# Patient Record
Sex: FEMALE | Race: WHITE | HISPANIC OR LATINO
[De-identification: names, ages, dates, MRNs, and addresses within clinical notes are randomized per-mention and may not be internally consistent; named-entity substitution may affect disease eponyms.]

---

## 2017-11-07 ENCOUNTER — RESULT REVIEW (OUTPATIENT)
Age: 37
End: 2017-11-07

## 2018-12-06 ENCOUNTER — OUTPATIENT (OUTPATIENT)
Dept: OUTPATIENT SERVICES | Facility: HOSPITAL | Age: 38
LOS: 1 days | End: 2018-12-06
Payer: COMMERCIAL

## 2018-12-06 ENCOUNTER — APPOINTMENT (OUTPATIENT)
Dept: ULTRASOUND IMAGING | Facility: IMAGING CENTER | Age: 38
End: 2018-12-06
Payer: COMMERCIAL

## 2018-12-06 ENCOUNTER — RESULT REVIEW (OUTPATIENT)
Age: 38
End: 2018-12-06

## 2018-12-06 DIAGNOSIS — Z90.49 ACQUIRED ABSENCE OF OTHER SPECIFIED PARTS OF DIGESTIVE TRACT: Chronic | ICD-10-CM

## 2018-12-06 DIAGNOSIS — Z00.8 ENCOUNTER FOR OTHER GENERAL EXAMINATION: ICD-10-CM

## 2018-12-06 PROCEDURE — 76942 ECHO GUIDE FOR BIOPSY: CPT | Mod: 26

## 2018-12-06 PROCEDURE — 88172 CYTP DX EVAL FNA 1ST EA SITE: CPT

## 2018-12-06 PROCEDURE — 88173 CYTOPATH EVAL FNA REPORT: CPT | Mod: 26

## 2018-12-06 PROCEDURE — 10022: CPT

## 2018-12-06 PROCEDURE — 88173 CYTOPATH EVAL FNA REPORT: CPT

## 2018-12-06 PROCEDURE — 76942 ECHO GUIDE FOR BIOPSY: CPT

## 2019-12-01 ENCOUNTER — TRANSCRIPTION ENCOUNTER (OUTPATIENT)
Age: 39
End: 2019-12-01

## 2021-02-04 ENCOUNTER — APPOINTMENT (OUTPATIENT)
Dept: INTERNAL MEDICINE | Facility: CLINIC | Age: 41
End: 2021-02-04

## 2021-04-23 ENCOUNTER — TRANSCRIPTION ENCOUNTER (OUTPATIENT)
Age: 41
End: 2021-04-23

## 2021-05-25 ENCOUNTER — APPOINTMENT (OUTPATIENT)
Dept: INTERNAL MEDICINE | Facility: CLINIC | Age: 41
End: 2021-05-25

## 2021-05-25 ENCOUNTER — APPOINTMENT (OUTPATIENT)
Dept: INTERNAL MEDICINE | Facility: CLINIC | Age: 41
End: 2021-05-25
Payer: COMMERCIAL

## 2021-05-25 VITALS
DIASTOLIC BLOOD PRESSURE: 80 MMHG | BODY MASS INDEX: 23.82 KG/M2 | HEART RATE: 79 BPM | TEMPERATURE: 97.7 F | SYSTOLIC BLOOD PRESSURE: 138 MMHG | RESPIRATION RATE: 16 BRPM | OXYGEN SATURATION: 98 % | HEIGHT: 65 IN | WEIGHT: 143 LBS

## 2021-05-25 DIAGNOSIS — Z78.9 OTHER SPECIFIED HEALTH STATUS: ICD-10-CM

## 2021-05-25 DIAGNOSIS — N60.11 DIFFUSE CYSTIC MASTOPATHY OF RIGHT BREAST: ICD-10-CM

## 2021-05-25 DIAGNOSIS — J45.909 UNSPECIFIED ASTHMA, UNCOMPLICATED: ICD-10-CM

## 2021-05-25 DIAGNOSIS — Z82.49 FAMILY HISTORY OF ISCHEMIC HEART DISEASE AND OTHER DISEASES OF THE CIRCULATORY SYSTEM: ICD-10-CM

## 2021-05-25 DIAGNOSIS — N60.12 DIFFUSE CYSTIC MASTOPATHY OF RIGHT BREAST: ICD-10-CM

## 2021-05-25 DIAGNOSIS — Z83.511 FAMILY HISTORY OF GLAUCOMA: ICD-10-CM

## 2021-05-25 DIAGNOSIS — Z82.5 FAMILY HISTORY OF ASTHMA AND OTHER CHRONIC LOWER RESPIRATORY DISEASES: ICD-10-CM

## 2021-05-25 DIAGNOSIS — Z83.49 FAMILY HISTORY OF OTHER ENDOCRINE, NUTRITIONAL AND METABOLIC DISEASES: ICD-10-CM

## 2021-05-25 PROCEDURE — 99204 OFFICE O/P NEW MOD 45 MIN: CPT

## 2021-05-26 ENCOUNTER — TRANSCRIPTION ENCOUNTER (OUTPATIENT)
Age: 41
End: 2021-05-26

## 2021-05-26 PROBLEM — Z78.9 NON-SMOKER: Status: ACTIVE | Noted: 2021-05-25

## 2021-05-26 PROBLEM — Z83.511 FAMILY HISTORY OF GLAUCOMA: Status: ACTIVE | Noted: 2021-05-25

## 2021-05-26 PROBLEM — J45.909 ASTHMA: Status: ACTIVE | Noted: 2021-05-26

## 2021-05-26 PROBLEM — N60.11 BILATERAL FIBROCYSTIC BREAST CHANGES: Status: ACTIVE | Noted: 2021-05-25

## 2021-05-26 PROBLEM — Z82.5 FAMILY HISTORY OF ASTHMA: Status: ACTIVE | Noted: 2021-05-25

## 2021-05-26 PROBLEM — Z78.9 SOCIAL ALCOHOL USE: Status: ACTIVE | Noted: 2021-05-25

## 2021-05-26 PROBLEM — Z83.49 FAMILY HISTORY OF THYROID DISEASE: Status: ACTIVE | Noted: 2021-05-26

## 2021-05-26 PROBLEM — Z82.49 FAMILY HISTORY OF HYPERTENSION: Status: ACTIVE | Noted: 2021-05-25

## 2021-05-26 RX ORDER — ALBUTEROL 90 MCG
90 AEROSOL (GRAM) INHALATION
Refills: 0 | Status: ACTIVE | COMMUNITY

## 2021-05-26 NOTE — HISTORY OF PRESENT ILLNESS
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

## 2021-05-26 NOTE — PHYSICAL EXAM
[No Acute Distress] : no acute distress [Well Nourished] : well nourished [Well Developed] : well developed [Well-Appearing] : well-appearing [Normal Sclera/Conjunctiva] : normal sclera/conjunctiva [PERRL] : pupils equal round and reactive to light [EOMI] : extraocular movements intact [Normal Outer Ear/Nose] : the outer ears and nose were normal in appearance [Normal Oropharynx] : the oropharynx was normal [No JVD] : no jugular venous distention [No Lymphadenopathy] : no lymphadenopathy [Supple] : supple [Thyroid Normal, No Nodules] : the thyroid was normal and there were no nodules present [No Respiratory Distress] : no respiratory distress  [No Accessory Muscle Use] : no accessory muscle use [Clear to Auscultation] : lungs were clear to auscultation bilaterally [Normal Rate] : normal rate  [Regular Rhythm] : with a regular rhythm [Normal S1, S2] : normal S1 and S2 [No Murmur] : no murmur heard [No Carotid Bruits] : no carotid bruits [No Abdominal Bruit] : a ~M bruit was not heard ~T in the abdomen [No Varicosities] : no varicosities [Pedal Pulses Present] : the pedal pulses are present [No Edema] : there was no peripheral edema [No Palpable Aorta] : no palpable aorta [No Extremity Clubbing/Cyanosis] : no extremity clubbing/cyanosis [Normal Appearance] : normal in appearance [No Nipple Discharge] : no nipple discharge [No Axillary Lymphadenopathy] : no axillary lymphadenopathy [Soft] : abdomen soft [Non Tender] : non-tender [Non-distended] : non-distended [No Masses] : no abdominal mass palpated [No HSM] : no HSM [Normal Bowel Sounds] : normal bowel sounds [Normal Posterior Cervical Nodes] : no posterior cervical lymphadenopathy [Normal Anterior Cervical Nodes] : no anterior cervical lymphadenopathy [No CVA Tenderness] : no CVA  tenderness [No Spinal Tenderness] : no spinal tenderness [No Joint Swelling] : no joint swelling [Grossly Normal Strength/Tone] : grossly normal strength/tone [No Rash] : no rash [Coordination Grossly Intact] : coordination grossly intact [No Focal Deficits] : no focal deficits [Normal Gait] : normal gait [Deep Tendon Reflexes (DTR)] : deep tendon reflexes were 2+ and symmetric [Normal Affect] : the affect was normal [Normal Insight/Judgement] : insight and judgment were intact [de-identified] : increased breast density right breast

## 2021-05-26 NOTE — HEALTH RISK ASSESSMENT
[] : No [No] : No [0] : 2) Feeling down, depressed, or hopeless: Not at all (0) [[id number]] : 0 [Patient reported PAP Smear was normal] : Patient reported PAP Smear was normal [PapSmearDate] : 2020

## 2021-05-28 ENCOUNTER — TRANSCRIPTION ENCOUNTER (OUTPATIENT)
Age: 41
End: 2021-05-28

## 2021-05-28 LAB
25(OH)D3 SERPL-MCNC: 20.9 NG/ML
ALBUMIN SERPL ELPH-MCNC: 4.7 G/DL
ALP BLD-CCNC: 79 U/L
ALT SERPL-CCNC: 11 U/L
ANION GAP SERPL CALC-SCNC: 11 MMOL/L
AST SERPL-CCNC: 17 U/L
BASOPHILS # BLD AUTO: 0.03 K/UL
BASOPHILS NFR BLD AUTO: 0.5 %
BILIRUB SERPL-MCNC: 0.3 MG/DL
BUN SERPL-MCNC: 16 MG/DL
CALCIUM SERPL-MCNC: 9.7 MG/DL
CHLORIDE SERPL-SCNC: 106 MMOL/L
CHOLEST SERPL-MCNC: 162 MG/DL
CO2 SERPL-SCNC: 21 MMOL/L
CREAT SERPL-MCNC: 0.73 MG/DL
EOSINOPHIL # BLD AUTO: 0.06 K/UL
EOSINOPHIL NFR BLD AUTO: 0.9 %
ESTIMATED AVERAGE GLUCOSE: 100 MG/DL
GLUCOSE SERPL-MCNC: 100 MG/DL
HBA1C MFR BLD HPLC: 5.1 %
HCT VFR BLD CALC: 37.8 %
HDLC SERPL-MCNC: 88 MG/DL
HGB BLD-MCNC: 11.8 G/DL
IMM GRANULOCYTES NFR BLD AUTO: 0.3 %
LDLC SERPL CALC-MCNC: 59 MG/DL
LYMPHOCYTES # BLD AUTO: 1.52 K/UL
LYMPHOCYTES NFR BLD AUTO: 22.8 %
MAN DIFF?: NORMAL
MCHC RBC-ENTMCNC: 26.3 PG
MCHC RBC-ENTMCNC: 31.2 GM/DL
MCV RBC AUTO: 84.4 FL
MONOCYTES # BLD AUTO: 0.46 K/UL
MONOCYTES NFR BLD AUTO: 6.9 %
NEUTROPHILS # BLD AUTO: 4.57 K/UL
NEUTROPHILS NFR BLD AUTO: 68.6 %
NONHDLC SERPL-MCNC: 75 MG/DL
PLATELET # BLD AUTO: 224 K/UL
POTASSIUM SERPL-SCNC: 4.3 MMOL/L
PROT SERPL-MCNC: 7.2 G/DL
RBC # BLD: 4.48 M/UL
RBC # FLD: 15.9 %
SODIUM SERPL-SCNC: 138 MMOL/L
TRIGL SERPL-MCNC: 77 MG/DL
TSH SERPL-ACNC: 0.44 UIU/ML
VIT B12 SERPL-MCNC: 515 PG/ML
WBC # FLD AUTO: 6.66 K/UL

## 2021-06-10 ENCOUNTER — NON-APPOINTMENT (OUTPATIENT)
Age: 41
End: 2021-06-10

## 2021-06-16 ENCOUNTER — NON-APPOINTMENT (OUTPATIENT)
Age: 41
End: 2021-06-16

## 2021-06-25 ENCOUNTER — TRANSCRIPTION ENCOUNTER (OUTPATIENT)
Age: 41
End: 2021-06-25

## 2021-06-28 ENCOUNTER — TRANSCRIPTION ENCOUNTER (OUTPATIENT)
Age: 41
End: 2021-06-28

## 2021-08-04 NOTE — HISTORY OF PRESENT ILLNESS
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

## 2021-08-05 ENCOUNTER — APPOINTMENT (OUTPATIENT)
Dept: INTERNAL MEDICINE | Facility: CLINIC | Age: 41
End: 2021-08-05

## 2021-08-18 NOTE — ASSESSMENT
[FreeTextEntry1] : OK to LM with lab results incident   Continue current regimen and medications.   medical clearance pending

## 2021-08-31 ENCOUNTER — APPOINTMENT (OUTPATIENT)
Dept: INTERNAL MEDICINE | Facility: CLINIC | Age: 41
End: 2021-08-31
Payer: COMMERCIAL

## 2021-08-31 DIAGNOSIS — M25.562 PAIN IN LEFT KNEE: ICD-10-CM

## 2021-08-31 DIAGNOSIS — K62.2 ANAL PROLAPSE: ICD-10-CM

## 2021-08-31 DIAGNOSIS — K59.00 CONSTIPATION, UNSPECIFIED: ICD-10-CM

## 2021-08-31 DIAGNOSIS — N84.0 POLYP OF CORPUS UTERI: ICD-10-CM

## 2021-08-31 PROCEDURE — 99442: CPT

## 2021-09-01 ENCOUNTER — TRANSCRIPTION ENCOUNTER (OUTPATIENT)
Age: 41
End: 2021-09-01

## 2021-09-01 PROBLEM — M25.562 LEFT KNEE PAIN: Status: ACTIVE | Noted: 2021-08-31

## 2021-09-01 PROBLEM — N84.0 UTERINE POLYP: Status: ACTIVE | Noted: 2021-09-01

## 2021-09-01 PROBLEM — K59.00 CONSTIPATION: Status: ACTIVE | Noted: 2021-09-01

## 2021-09-01 PROBLEM — K62.2 ANAL PROLAPSE: Status: ACTIVE | Noted: 2021-08-31

## 2021-09-01 NOTE — HISTORY OF PRESENT ILLNESS
[Home] : at home, [unfilled] , at the time of the visit. 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 PMH multinodular goiter, exercise-induced asthma, fibrocystic breast, uterine polyps presents for follow up\par \par s/p uterine polypectomy at [facility] 1 month ago\par constipation after polypectomy - on psyllium and fluids\par BM qod now\par endorses anal prolapse, denies rectal bleeding\par \par s/p left breast bx for left breast pain - obstructed ducts - supportive treatment\par repeat breast US in 6 mos\par \par endorses flare chronic LBP after surgery\par no motor or sensory changes, denies bowel or bladder dysfunction\par \par endorses left knee pain after surgery\par admits not able to exercise routinely given recent procedures and surgeries

## 2021-09-02 ENCOUNTER — APPOINTMENT (OUTPATIENT)
Dept: SURGERY | Facility: CLINIC | Age: 41
End: 2021-09-02
Payer: COMMERCIAL

## 2021-09-02 VITALS
WEIGHT: 138 LBS | HEART RATE: 80 BPM | HEIGHT: 65 IN | DIASTOLIC BLOOD PRESSURE: 80 MMHG | SYSTOLIC BLOOD PRESSURE: 129 MMHG | BODY MASS INDEX: 22.99 KG/M2

## 2021-09-02 DIAGNOSIS — K60.2 ANAL FISSURE, UNSPECIFIED: ICD-10-CM

## 2021-09-02 PROCEDURE — 46600 DIAGNOSTIC ANOSCOPY SPX: CPT

## 2021-09-02 PROCEDURE — 99203 OFFICE O/P NEW LOW 30 MIN: CPT | Mod: 25

## 2021-09-02 RX ORDER — LIDOCAINE 5 G/100G
5 OINTMENT TOPICAL
Qty: 60 | Refills: 1 | Status: ACTIVE | COMMUNITY
Start: 2021-09-02 | End: 1900-01-01

## 2021-09-02 RX ORDER — HYDROCORTISONE 25 MG/G
2.5 CREAM TOPICAL
Qty: 1 | Refills: 1 | Status: ACTIVE | COMMUNITY
Start: 2021-09-02 | End: 1900-01-01

## 2021-09-04 NOTE — HISTORY OF PRESENT ILLNESS
[FreeTextEntry1] : Ms. Marina Bedolla is a 40y.o. F presenting for rectal pain and feeling like something is dropping in and out of her rectum. Back on July 21st she had a procedure for uterine polyps and has had an issue with constipation since then. Now when she goes to the bathroom (even sometimes just to urinate) she feels like there is a small piece of tissue on the posterior aspect that descends and almost comes out but then goes back in when she stops pushing. She was also having burning, itching and a feeling like a cut right on the inside. She was prescribed hydrocortisone and a laxative which helped though she stopped the laxative due to diarrhea. She has since started psyllium husk BID for the past wk and colace for the past 2days. Prior to the uterine procedure and constipation she had BMs shortly after meals and they were not hard. Now she has them once a day and they are weird looking (look strung out) and sometimes there are corey w/ some straining. She has never had a colonoscopy.

## 2021-09-04 NOTE — ASSESSMENT
[FreeTextEntry1] : 40y.o. F w/ anal fissure and spontaneously reducing prolapsing internal hemorrhoid.

## 2021-09-04 NOTE — REVIEW OF SYSTEMS
[As Noted in HPI] : as noted in HPI [Constipation] : constipation [Negative] : Integumentary [Fever] : no fever [Chills] : no chills [Recent Weight Loss (___ Lbs)] : no recent weight loss [Eye Pain] : no eye pain [Earache] : no earache [Chest Pain] : no chest pain [Palpitations] : no palpitations [Shortness Of Breath] : no shortness of breath [Abdominal Pain] : no abdominal pain [Vomiting] : no vomiting [Dysuria] : no dysuria

## 2021-09-04 NOTE — PHYSICAL EXAM
[Reduce Spontaneously] : a spontaneously reducible (grade II) [Normal] : was normal [Posterior] : posteriorly [None] : there was no rectal mass  [Normal Breath Sounds] : Normal breath sounds [Normal Heart Sounds] : normal heart sounds [Normal Rate and Rhythm] : normal rate and rhythm [Alert] : alert [Oriented to Person] : oriented to person [Oriented to Place] : oriented to place [Oriented to Time] : oriented to time [Excoriation] : no perianal excoriation [Fistula] : no fistulas [Gross Blood] : no gross blood [JVD] : no jugular venous distention  [Wheezing] : no wheezing was heard [de-identified] : soft, non-distended, non-tender to palpation [de-identified] : no external hemorrhoids, normal rectal tone, mild posterior midline pain on JB [de-identified] : awake, alert, in NAD [de-identified] : normocephalic, atraumatic, EOMI, nl conjunctiva [de-identified] : b/l chest rise, EWOB on RA [de-identified] : deferred [de-identified] : normal strength [de-identified] : perianal skin as above [de-identified] : normal mood and affect

## 2021-09-04 NOTE — PROCEDURE
[FreeTextEntry1] : Anoscopy - performed with small lighted disposable anoscope; healing posterior midline fissure, RP prolapsing internal hemorrhoid, normal rectal mucosa

## 2022-01-18 ENCOUNTER — APPOINTMENT (OUTPATIENT)
Dept: SURGERY | Facility: CLINIC | Age: 42
End: 2022-01-18
Payer: COMMERCIAL

## 2022-01-18 VITALS
OXYGEN SATURATION: 98 % | HEART RATE: 85 BPM | WEIGHT: 140 LBS | HEIGHT: 65 IN | SYSTOLIC BLOOD PRESSURE: 122 MMHG | BODY MASS INDEX: 23.32 KG/M2 | DIASTOLIC BLOOD PRESSURE: 78 MMHG

## 2022-01-18 VITALS — TEMPERATURE: 97 F

## 2022-01-18 PROCEDURE — 99214 OFFICE O/P EST MOD 30 MIN: CPT

## 2022-01-25 NOTE — PHYSICAL EXAM
[Excoriation] : no perianal excoriation [Reduce Spontaneously] : a spontaneously reducible (grade II) [Normal] : was normal [None] : there was no rectal mass  [Gross Blood] : no gross blood [JVD] : no jugular venous distention  [Normal Breath Sounds] : Normal breath sounds [Wheezing] : no wheezing was heard [Normal Heart Sounds] : normal heart sounds [Normal Rate and Rhythm] : normal rate and rhythm [Alert] : alert [Oriented to Person] : oriented to person [Oriented to Place] : oriented to place [Oriented to Time] : oriented to time [de-identified] : soft, non-distended, non-tender to palpation [de-identified] : no external hemorrhoids, normal rectal tone, no significant pain on JB [de-identified] : awake, alert, in NAD [de-identified] : normocephalic, atraumatic, EOMI, nl conjunctiva [de-identified] : b/l chest rise, EWOB on RA [de-identified] : deferred [de-identified] : normal strength [de-identified] : perianal skin as above [de-identified] : normal mood and affect

## 2022-01-25 NOTE — HISTORY OF PRESENT ILLNESS
[FreeTextEntry1] : Ms. Marina Bedolla is a 41y.o. F presenting for follow-up for her anal fissure. Back on July 21st, 2021 she had a procedure for uterine polyps and has had an issue with constipation since then. Now when she goes to the bathroom (even sometimes just to urinate) she feels like there is a small piece of tissue on the posterior aspect that descends and almost comes out but then goes back in when she stops pushing. She was also having burning, itching and a feeling like a cut right on the inside. She was prescribed hydrocortisone and a laxative which helped though she stopped the laxative due to diarrhea. She has since started psyllium husk BID for the past wk and colace for the past 2days. Prior to the uterine procedure and constipation she had BMs shortly after meals and they were not hard. After that she had them once a day and they are weird looking (look strung out) and sometimes there are corey w/ some straining. She has never had a colonoscopy. She completed the creams and treatment for the anal fissure and she reports doing much better. She has continued with the psyllium husk which has helped. no back pain, no gout, no musculoskeletal pain, no neck pain, and no weakness.

## 2022-01-25 NOTE — ASSESSMENT
[FreeTextEntry1] : 41y.o. F w/ anal fissure and spontaneously reducing prolapsing internal hemorrhoid.

## 2022-02-18 ENCOUNTER — APPOINTMENT (OUTPATIENT)
Dept: ENDOCRINOLOGY | Facility: CLINIC | Age: 42
End: 2022-02-18
Payer: COMMERCIAL

## 2022-02-18 VITALS
OXYGEN SATURATION: 99 % | TEMPERATURE: 97.9 F | HEIGHT: 65 IN | SYSTOLIC BLOOD PRESSURE: 120 MMHG | HEART RATE: 78 BPM | WEIGHT: 140 LBS | BODY MASS INDEX: 23.32 KG/M2 | DIASTOLIC BLOOD PRESSURE: 80 MMHG

## 2022-02-18 DIAGNOSIS — R79.89 OTHER SPECIFIED ABNORMAL FINDINGS OF BLOOD CHEMISTRY: ICD-10-CM

## 2022-02-18 DIAGNOSIS — E04.9 NONTOXIC GOITER, UNSPECIFIED: ICD-10-CM

## 2022-02-18 DIAGNOSIS — Z00.00 ENCOUNTER FOR GENERAL ADULT MEDICAL EXAMINATION W/OUT ABNORMAL FINDINGS: ICD-10-CM

## 2022-02-18 PROCEDURE — 99204 OFFICE O/P NEW MOD 45 MIN: CPT

## 2022-02-18 NOTE — REVIEW OF SYSTEMS
[Fatigue] : no fatigue [Decreased Appetite] : appetite not decreased [Recent Weight Gain (___ Lbs)] : no recent weight gain [Recent Weight Loss (___ Lbs)] : no recent weight loss [Visual Field Defect] : no visual field defect [Dry Eyes] : no dryness [Dysphagia] : no dysphagia [Neck Pain] : no neck pain [Dysphonia] : no dysphonia [Nasal Congestion] : no nasal congestion [Chest Pain] : no chest pain [Slow Heart Rate] : heart rate is not slow [Palpitations] : no palpitations [Fast Heart Rate] : heart rate is not fast [Shortness Of Breath] : no shortness of breath [Nausea] : no nausea [Cough] : no cough [Vomiting] : no vomiting [Polyuria] : no polyuria [Irregular Menses] : regular menses [Joint Pain] : no joint pain [Headaches] : no headaches [Dizziness] : no dizziness [Tremors] : no tremors [Pain/Numbness of Digits] : no pain/numbness of digits [Polydipsia] : no polydipsia [Cold Intolerance] : no cold intolerance [Easy Bleeding] : no ~M tendency for easy bleeding [Easy Bruising] : no tendency for easy bruising

## 2022-02-18 NOTE — PHYSICAL EXAM
[Alert] : alert [Well Nourished] : well nourished [Normal Sclera/Conjunctiva] : normal sclera/conjunctiva [EOMI] : extra ocular movement intact [PERRL] : pupils equal, round and reactive to light [Normal Outer Ear/Nose] : the ears and nose were normal in appearance [Normal Hearing] : hearing was normal [Normal TMs] : both tympanic membranes were normal [No Neck Mass] : no neck mass was observed [Thyroid Not Enlarged] : the thyroid was not enlarged [No Respiratory Distress] : no respiratory distress [Clear to Auscultation] : lungs were clear to auscultation bilaterally [Normal S1, S2] : normal S1 and S2 [Normal Rate] : heart rate was normal [Regular Rhythm] : with a regular rhythm [Normal Bowel Sounds] : normal bowel sounds [Not Tender] : non-tender [Soft] : abdomen soft [Normal Gait] : normal gait [No Clubbing, Cyanosis] : no clubbing  or cyanosis of the fingernails [No Joint Swelling] : no joint swelling seen [Normal Strength/Tone] : muscle strength and tone were normal [No Rash] : no rash [No Skin Lesions] : no skin lesions [No Motor Deficits] : the motor exam was normal [Normal Reflexes] : deep tendon reflexes were 2+ and symmetric [No Tremors] : no tremors [Oriented x3] : oriented to person, place, and time [Normal Affect] : the affect was normal [Normal Insight/Judgement] : insight and judgment were intact [Normal Mood] : the mood was normal

## 2022-02-18 NOTE — ASSESSMENT
[FreeTextEntry1] : 41 year old female with history of thyroid nodules here for evaluation. \par She has multiple cysts in bilateral lobes. \par However on the left she is noted to have a mixed nodule 2.79 x 2.05 x 2.62 cm, predominantly cystic \par No compressive neck symptoms \par Will continue observation \par If further growth noted, aspiration and ethanol ablation has been discussed with patient\par \par \par -Follow up in 6 months for repeat US

## 2022-02-18 NOTE — HISTORY OF PRESENT ILLNESS
[FreeTextEntry1] : 41 year old female here for evaluation of thyroid nodules \par \par History of 2 biopsies and the last one was in 2019 \par She has been having check ups with an ultrasound \par \par She reported that she has been having low moods, periods have been light and decreased energy \par Increased hunger and bowel movements \par Decreased sleep, heart palpitations and tremors \par

## 2022-02-22 LAB
25(OH)D3 SERPL-MCNC: 26.8 NG/ML
ANION GAP SERPL CALC-SCNC: 13 MMOL/L
BUN SERPL-MCNC: 12 MG/DL
CALCIUM SERPL-MCNC: 10.2 MG/DL
CHLORIDE SERPL-SCNC: 105 MMOL/L
CHOLEST SERPL-MCNC: 164 MG/DL
CO2 SERPL-SCNC: 23 MMOL/L
CREAT SERPL-MCNC: 0.71 MG/DL
ESTIMATED AVERAGE GLUCOSE: 103 MG/DL
GLUCOSE SERPL-MCNC: 87 MG/DL
HBA1C MFR BLD HPLC: 5.2 %
HDLC SERPL-MCNC: 88 MG/DL
LDLC SERPL CALC-MCNC: 63 MG/DL
NONHDLC SERPL-MCNC: 76 MG/DL
POTASSIUM SERPL-SCNC: 4.8 MMOL/L
SODIUM SERPL-SCNC: 141 MMOL/L
T4 FREE SERPL-MCNC: 1.4 NG/DL
TRIGL SERPL-MCNC: 64 MG/DL
TSH SERPL-ACNC: 0.56 UIU/ML

## 2022-03-08 LAB
METANEPHRINE, PL: 55.6 PG/ML
NORMETANEPHRINE, PL: 74.8 PG/ML

## 2022-05-21 ENCOUNTER — APPOINTMENT (OUTPATIENT)
Dept: INTERNAL MEDICINE | Facility: CLINIC | Age: 42
End: 2022-05-21
Payer: COMMERCIAL

## 2022-05-21 VITALS
HEART RATE: 78 BPM | BODY MASS INDEX: 22.82 KG/M2 | TEMPERATURE: 98.8 F | RESPIRATION RATE: 16 BRPM | HEIGHT: 65 IN | DIASTOLIC BLOOD PRESSURE: 90 MMHG | OXYGEN SATURATION: 100 % | WEIGHT: 137 LBS | SYSTOLIC BLOOD PRESSURE: 151 MMHG

## 2022-05-21 PROCEDURE — 99214 OFFICE O/P EST MOD 30 MIN: CPT

## 2022-05-23 NOTE — HISTORY OF PRESENT ILLNESS
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

## 2022-05-29 ENCOUNTER — EMERGENCY (EMERGENCY)
Facility: HOSPITAL | Age: 42
LOS: 1 days | Discharge: HOME | End: 2022-05-29
Attending: STUDENT IN AN ORGANIZED HEALTH CARE EDUCATION/TRAINING PROGRAM
Payer: COMMERCIAL

## 2022-05-29 ENCOUNTER — TRANSCRIPTION ENCOUNTER (OUTPATIENT)
Age: 42
End: 2022-05-29

## 2022-05-29 VITALS
TEMPERATURE: 98 F | HEIGHT: 65 IN | DIASTOLIC BLOOD PRESSURE: 95 MMHG | OXYGEN SATURATION: 100 % | WEIGHT: 138.89 LBS | RESPIRATION RATE: 18 BRPM | SYSTOLIC BLOOD PRESSURE: 150 MMHG | HEART RATE: 58 BPM

## 2022-05-29 VITALS
DIASTOLIC BLOOD PRESSURE: 82 MMHG | TEMPERATURE: 98 F | SYSTOLIC BLOOD PRESSURE: 135 MMHG | HEART RATE: 57 BPM | RESPIRATION RATE: 18 BRPM | OXYGEN SATURATION: 100 %

## 2022-05-29 DIAGNOSIS — Z90.49 ACQUIRED ABSENCE OF OTHER SPECIFIED PARTS OF DIGESTIVE TRACT: Chronic | ICD-10-CM

## 2022-05-29 PROCEDURE — 99283 EMERGENCY DEPT VISIT LOW MDM: CPT

## 2022-05-29 RX ORDER — LIDOCAINE 4 G/100G
1 CREAM TOPICAL ONCE
Refills: 0 | Status: COMPLETED | OUTPATIENT
Start: 2022-05-29 | End: 2022-05-29

## 2022-05-29 RX ORDER — ACETAMINOPHEN 500 MG
650 TABLET ORAL ONCE
Refills: 0 | Status: COMPLETED | OUTPATIENT
Start: 2022-05-29 | End: 2022-05-29

## 2022-05-29 RX ORDER — DIAZEPAM 5 MG
5 TABLET ORAL ONCE
Refills: 0 | Status: DISCONTINUED | OUTPATIENT
Start: 2022-05-29 | End: 2022-05-29

## 2022-05-29 RX ORDER — IBUPROFEN 200 MG
600 TABLET ORAL ONCE
Refills: 0 | Status: COMPLETED | OUTPATIENT
Start: 2022-05-29 | End: 2022-05-29

## 2022-05-29 RX ADMIN — LIDOCAINE 1 PATCH: 4 CREAM TOPICAL at 18:14

## 2022-05-29 RX ADMIN — Medication 650 MILLIGRAM(S): at 18:13

## 2022-05-29 RX ADMIN — Medication 600 MILLIGRAM(S): at 18:13

## 2022-05-29 RX ADMIN — Medication 5 MILLIGRAM(S): at 19:12

## 2022-05-29 NOTE — ED PROVIDER NOTE - OBJECTIVE STATEMENT
41F PMH herniated disc, CC neck pain. PT denotes neck pain for two weeks no trauma, went to PCP was given flexaril. PT denotes did not help but helped pt sleep. Coming in today due to consistent pain. Pain located over cervical region, worse w/ moving neck to L and R. Sometimes has tingilng in arms when pain is bad.  Did not take Tylenol or Motrin. Also endorsing HA when pain is sever   LMP today  Denies F CP SOB N/v

## 2022-05-29 NOTE — ED ADULT NURSE NOTE - DRUG PRE-SCREENING (DAST -1)
NOVOLOG 100 UNIT/ML FLEXPEN   Last Written Prescription Date:  1/10/2020  Last Fill Quantity: 30,   # refills: 3  Last Office Visit : 5/15/2020  Future Office visit:      Routing refill request to provider for review/approval because:  Drug not on the [facility], [facility] or [facility] refill protocol or controlled substance      Dulce Sanchez RN  Central Triage Red Flags/Med Refills     Statement Selected

## 2022-05-29 NOTE — ED PROVIDER NOTE - NSFOLLOWUPCLINICS_GEN_ALL_ED_FT
[facility]  Sports Medicine  [address] [place] [address]  Phone: [phone]  Fax:   Follow Up Time: 1-3 Days

## 2022-05-29 NOTE — ED PROVIDER NOTE - PATIENT PORTAL LINK FT
You can access the FollowMyHealth Patient Portal offered by [facility] by registering at the following website: [email or web address]. By joining [employer]’s FollowMyHealth portal, you will also be able to view your health information using other applications (apps) compatible with our system.

## 2022-05-29 NOTE — ED ADULT NURSE NOTE - NSICDXPASTMEDICALHX_GEN_ALL_CORE_FT
PAST MEDICAL HISTORY:  Asthma     Lower Back Pain     Refusal of Blood Transfusions as Patient Is Evangelical

## 2022-05-29 NOTE — ED PROVIDER NOTE - NSICDXPASTMEDICALHX_GEN_ALL_CORE_FT
PAST MEDICAL HISTORY:  Asthma     Lower Back Pain     Refusal of Blood Transfusions as Patient Is Tenriism

## 2022-05-29 NOTE — ED PROVIDER NOTE - DOMESTIC TRAVEL HIGH RISK QUESTION
[FreeTextEntry3] : I, Ki Victoria, authored this note working as a medical scribe for Dr. De La Rosa.  10/06/2021.  4:30PM. This note was authored by the medical scribe for me. I have reviewed, edited, and revised the note as needed. I am in agreement with the exam findings, imaging findings, and treatment plan.  Renato De La Rosa MD 
No

## 2022-05-29 NOTE — ED PROVIDER NOTE - CLINICAL SUMMARY MEDICAL DECISION MAKING FREE TEXT BOX
41F w prior Cervical disc bulging on MRI in cervical region. CC 41F w prior Cervical disc bulging on MRI in cervical region. CC neck pain given hx and physical likely MSK in origin

## 2022-05-29 NOTE — ED PROVIDER NOTE - NSFOLLOWUPINSTRUCTIONS_ED_ALL_ED_FT
Today you were seen in the ED for     It was found that you have    Please follow up with    Please return to the ED if you have any of the following: Today you were seen in the ED for neck pain    It was found that you have no signs of a medical emergency on today's evaluation.     Please follow up with your primary care provider in regards to today's visit. In addition you can also visit the sports clinic, information for such can be found below.     I recommend you start and continue to take the steroid medication as prescribed by your primary care provider.     You can take 650 Tylenol and 400-600mg Motrin every 8 hours for pain as needed.     Please return to the ED if you have any of the following: Severe dizziness, lightheadedness, loss of vision, severe headache

## 2022-05-29 NOTE — ED PROVIDER NOTE - PHYSICAL EXAMINATION
GENERAL: Awake, alert, NAD  HEENT:  paraspinal tension in Cervical region, no paraspinal or midline tenderness.   LUNGS: CTAB, no wheezes or crackles   CARDIAC: RRR, no m/r/g  Ext: Full sensation UE warm color   ABDOMEN: Soft, non tender, non distended, no rebound, no guarding  NEURO: A&Ox3. Moving all extremities.  SKIN: Warm and dry. No rash.  PSYCH: Normal affect.

## 2022-05-29 NOTE — ED ADULT NURSE NOTE - OBJECTIVE STATEMENT
40y/o F coming to the ED c.o neck pain. 42y/o F coming to the ED c.o neck pain. Pt states x2weeks c/o B/L neck pain. Pt denies trauma or lifing heavy objects. Pt states she was prescribed flexiril with minimal relief. Pt states pain worsens with movement left to right with occasional tingling down arms when pain is severe. Pt denies any CP/SOB/Fever/Chills/N/V/D. On exam, pt is breathing spontaneously, able to speak full sentences w/o difficulty, saturating 98% RA. VSS.

## 2022-05-29 NOTE — ED PROVIDER NOTE - ATTENDING CONTRIBUTION TO CARE
41 F w/ herniated disc c5/c6, and c6/c7 presents to the Er w/ neck stiffness, pt reports that for two weeks she has been having worsening neck pain and has limited ROM of the neck due to muscle stiffening, pt w/ no fevers, no chills, no cough, no sob, no cp, pt w/ no lightheadedness, no dizziness. On exam, pt is awake and alert in no distress has clear lungs soft abdomen no lower leg edema, pt w/ 5/5 upper and lower extremity strength. intact sensation in the bilateral arms/ and legs, pt w/ intact ROM of neck w/ limited beyond 45 degrees laterally, Will have meds, pt w/ no midline c/t spine tenderness, reassessment, suspect muscle strain/spasm, will given valium and reassess low suspicion for meningismus given pt afebrile. no neuro deficit, low suspicion for acute spinal cord compression given pts history.

## 2022-06-02 ENCOUNTER — TRANSCRIPTION ENCOUNTER (OUTPATIENT)
Age: 42
End: 2022-06-02

## 2022-06-07 ENCOUNTER — APPOINTMENT (OUTPATIENT)
Dept: SPORTS MEDICINE | Facility: CLINIC | Age: 42
End: 2022-06-07

## 2022-06-07 PROCEDURE — 99204 OFFICE O/P NEW MOD 45 MIN: CPT

## 2022-06-07 NOTE — PHYSICAL EXAM
[de-identified] : General Appearance: Well nourished, well developed, in NAD\par Respiratory: NARD\par Skin: No lesions\par Neuro: Awake and alert\par Mood/affect: Calm\par \par Neck Exam: \par Inspection: No visible deformity\par Palpation: No tenderness with palpation of midline spinous processes, no step off or deformity. No paraspinal tenderness. No trigger point tenderness\par ROM: Flexion: 60, Extension: 60, side bend R/L: 45/30 rotation R/L: 70/45\par C5 (Deltoid str/Lateral arm sensation) (R/L): 5/5 R&L / normal\par C6 (Wrist Extension str/Lateral forearm sensation) (R/L): 5/5 R&L / normal R&L\par C7 (Triceps str/ Middle finger sensation) (R/L): 5/5 R&L / normal R&L\par C8 ( str/ Medial forearm sensation) (R/L): 5/5 R&L / normal R&L\par T1 (Interossei str/ Medial arm sensation): 5/5 R&L / normal R&L\par Spurling (R/L): -/-\par Biceps reflex 2+ bilaterally\par Brachioradialis reflex 2+ bilaterally \par Roman negative bilaterally  [de-identified] : Reviewed cervical spine images brought in from patient taken within the last week: There is loss of the normal cervical lordosis with normal joint spaces and alignment, c1 - c7 visualized.

## 2022-06-07 NOTE — HISTORY OF PRESENT ILLNESS
[de-identified] : 41yF w/ R sided trapezius and neck pain with neck stiffness for the last about 2-3 weeks. Atraumatic, gradual, fluctuating intensity, worse with turning or looking, better with laying down with neck support. Denies radicular symptoms, denies numbness or tingling, fever or chills, weight loss, denies weakness although notes occasional L index finger weakness. No cancer hx. Notes overall improving, initially seen in ED and was treated with pain medications, saw her PCP who obtained cervical x ray noting loss of cervical lordosis and was started on medrol dose kerline. Today feels a lot better with improved range of motion. Previously dxed with cervical disc herniations and had trigger point injections with improvement in symptoms.

## 2022-06-07 NOTE — DISCUSSION/SUMMARY
[de-identified] : 41yF w/ chronic neck pain exacerbation, likely related to surrounding neck musculature spasm secondary to poor posture as evidenced on  Xray. No radicular symptoms on hx or with exam. There is no trauma. Patient has had improvement with medrol dose pack. No emergent findings on history or exam. \par 1) Begin PT for cervical strengthening\par 2) Follow up 6 weeks or PRN if improved. If not improved, discussed possible MRI\par 3) No surgical indications at this time\par    Attending note. Agree with the above. Impression: Right cervical radiculopathy improved with corticosteroids orally. She was provided with a prescription for physical therapy. She will return to the office if she has recurrent symptoms.

## 2022-06-20 PROBLEM — Z00.00 ENCOUNTER FOR PREVENTIVE HEALTH EXAMINATION: Status: ACTIVE | Noted: 2022-06-20

## 2022-07-01 ENCOUNTER — LABORATORY RESULT (OUTPATIENT)
Age: 42
End: 2022-07-01

## 2022-07-01 ENCOUNTER — APPOINTMENT (OUTPATIENT)
Dept: INTERNAL MEDICINE | Facility: CLINIC | Age: 42
End: 2022-07-01

## 2022-07-01 VITALS
HEIGHT: 65 IN | BODY MASS INDEX: 22.16 KG/M2 | RESPIRATION RATE: 16 BRPM | OXYGEN SATURATION: 97 % | TEMPERATURE: 97.7 F | SYSTOLIC BLOOD PRESSURE: 129 MMHG | DIASTOLIC BLOOD PRESSURE: 81 MMHG | WEIGHT: 133 LBS | HEART RATE: 80 BPM

## 2022-07-01 DIAGNOSIS — R19.7 DIARRHEA, UNSPECIFIED: ICD-10-CM

## 2022-07-01 PROCEDURE — 99214 OFFICE O/P EST MOD 30 MIN: CPT

## 2022-07-01 RX ORDER — METHYLPREDNISOLONE 4 MG/1
4 TABLET ORAL
Qty: 1 | Refills: 0 | Status: DISCONTINUED | COMMUNITY
Start: 2022-05-29 | End: 2022-07-01

## 2022-07-04 PROBLEM — R19.7 DIARRHEA: Status: ACTIVE | Noted: 2022-07-01

## 2022-07-04 NOTE — HISTORY OF PRESENT ILLNESS
[FreeTextEntry8] : 41yoF PMH MNG, DJD spine presents with 1-2 weeks nonbloody diarrhea\par \par completed medrol pack and posterior neck pain resolved\par ortho spine - no intervention\par \par endorses 1-2 weeks nonbloody diarrhea, 2-4 times per day associated with crampy abdominal pain and nausea\par stools recently with more form\par denies fever, chills, recent travel, sick contacts, recent antibiotic use

## 2022-07-08 ENCOUNTER — NON-APPOINTMENT (OUTPATIENT)
Age: 42
End: 2022-07-08

## 2022-07-08 DIAGNOSIS — D64.9 ANEMIA, UNSPECIFIED: ICD-10-CM

## 2022-07-08 LAB
ALBUMIN SERPL ELPH-MCNC: 4.7 G/DL
ALP BLD-CCNC: 75 U/L
ALT SERPL-CCNC: 10 U/L
ANION GAP SERPL CALC-SCNC: 12 MMOL/L
AST SERPL-CCNC: 19 U/L
BASOPHILS # BLD AUTO: 0.03 K/UL
BASOPHILS # BLD AUTO: 0.03 K/UL
BASOPHILS NFR BLD AUTO: 0.6 %
BASOPHILS NFR BLD AUTO: 0.7 %
BILIRUB SERPL-MCNC: 0.5 MG/DL
BUN SERPL-MCNC: 11 MG/DL
CALCIUM SERPL-MCNC: 9.6 MG/DL
CHLORIDE SERPL-SCNC: 108 MMOL/L
CO2 SERPL-SCNC: 24 MMOL/L
CREAT SERPL-MCNC: 0.7 MG/DL
EGFR: 112 ML/MIN/1.73M2
EOSINOPHIL # BLD AUTO: 0.05 K/UL
EOSINOPHIL # BLD AUTO: 0.07 K/UL
EOSINOPHIL NFR BLD AUTO: 1.1 %
EOSINOPHIL NFR BLD AUTO: 1.7 %
GI PCR PANEL, STOOL: NORMAL
GLUCOSE SERPL-MCNC: 74 MG/DL
HCT VFR BLD CALC: 35.1 %
HCT VFR BLD CALC: 35.5 %
HGB BLD-MCNC: 10.2 G/DL
HGB BLD-MCNC: 10.5 G/DL
IMM GRANULOCYTES NFR BLD AUTO: 0.2 %
IMM GRANULOCYTES NFR BLD AUTO: 0.2 %
LYMPHOCYTES # BLD AUTO: 1.2 K/UL
LYMPHOCYTES # BLD AUTO: 1.42 K/UL
LYMPHOCYTES NFR BLD AUTO: 29.4 %
LYMPHOCYTES NFR BLD AUTO: 30.1 %
MAGNESIUM SERPL-MCNC: 2.2 MG/DL
MAN DIFF?: NORMAL
MAN DIFF?: NORMAL
MCHC RBC-ENTMCNC: 22.7 PG
MCHC RBC-ENTMCNC: 22.8 PG
MCHC RBC-ENTMCNC: 28.7 GM/DL
MCHC RBC-ENTMCNC: 29.9 GM/DL
MCV RBC AUTO: 76.3 FL
MCV RBC AUTO: 78.9 FL
MONOCYTES # BLD AUTO: 0.42 K/UL
MONOCYTES # BLD AUTO: 0.46 K/UL
MONOCYTES NFR BLD AUTO: 10.3 %
MONOCYTES NFR BLD AUTO: 9.7 %
NEUTROPHILS # BLD AUTO: 2.35 K/UL
NEUTROPHILS # BLD AUTO: 2.75 K/UL
NEUTROPHILS NFR BLD AUTO: 57.7 %
NEUTROPHILS NFR BLD AUTO: 58.3 %
PLATELET # BLD AUTO: 134 K/UL
PLATELET # BLD AUTO: 254 K/UL
POTASSIUM SERPL-SCNC: 4.5 MMOL/L
PROT SERPL-MCNC: 7.1 G/DL
RBC # BLD: 4.5 M/UL
RBC # BLD: 4.6 M/UL
RBC # FLD: 18.1 %
RBC # FLD: 18.6 %
SODIUM SERPL-SCNC: 143 MMOL/L
TSH SERPL-ACNC: 0.79 UIU/ML
WBC # FLD AUTO: 4.08 K/UL
WBC # FLD AUTO: 4.72 K/UL

## 2022-07-25 ENCOUNTER — APPOINTMENT (OUTPATIENT)
Dept: GASTROENTEROLOGY | Facility: CLINIC | Age: 42
End: 2022-07-25

## 2022-08-17 ENCOUNTER — APPOINTMENT (OUTPATIENT)
Dept: ENDOCRINOLOGY | Facility: CLINIC | Age: 42
End: 2022-08-17

## 2022-08-17 VITALS
HEIGHT: 65 IN | OXYGEN SATURATION: 99 % | SYSTOLIC BLOOD PRESSURE: 100 MMHG | HEART RATE: 61 BPM | TEMPERATURE: 97.7 F | BODY MASS INDEX: 22.99 KG/M2 | WEIGHT: 138 LBS | DIASTOLIC BLOOD PRESSURE: 60 MMHG

## 2022-08-17 PROCEDURE — 76536 US EXAM OF HEAD AND NECK: CPT

## 2022-08-17 PROCEDURE — 99213 OFFICE O/P EST LOW 20 MIN: CPT | Mod: 25

## 2022-08-17 NOTE — PROCEDURE
[MobileDay e 2008 model, 10-12 MHz frequencies] : multiple real time longitudinal and transverse images were obtained using a high resolution ultrasound with a linear transducer, MobileDay e 2008 model, 10-12 MHz frequencies. All measurements will be reported as longitudinal x alden-posterior x transverse. [] : a homogeneous parenchyma [Left Thyroid] : left [Mid] : mid pole there is a  [Cyst] : cyst [Ovoid] : ovoid in shape [Smooth] : smooth [No] : does not have a halo [No calcification] : no calcification [Peripheral vascularity] : peripheral vascularity [2] : 2 [No abnormal lymph nodes are seen.] : no abnormal lymph nodes are seen [FreeTextEntry1] : 7.25 x 1.87 x 3.18 [FreeTextEntry5] : 6.1 x 2.92 x 3.1 [FreeTextEntry2] : 0.62 [FreeTextEntry3] : 3.13 x 2.42 x 2.81

## 2022-08-17 NOTE — PROCEDURE
[[employer] e 2008 model, 10-12 MHz frequencies] : multiple real time longitudinal and transverse images were obtained using a high resolution ultrasound with a linear transducer, [employer] e 2008 model, 10-12 MHz frequencies. All measurements will be reported as longitudinal x alden-posterior x transverse. [] : a homogeneous parenchyma [Left Thyroid] : left [Mid] : mid pole there is a  [Cyst] : cyst [Ovoid] : ovoid in shape [Smooth] : smooth [No] : does not have a halo [No calcification] : no calcification [Peripheral vascularity] : peripheral vascularity [2] : 2 [No abnormal lymph nodes are seen.] : no abnormal lymph nodes are seen [FreeTextEntry1] : 7.25 x 1.87 x 3.18 [FreeTextEntry5] : 6.1 x 2.92 x 3.1 [FreeTextEntry2] : 0.62 [FreeTextEntry3] : 3.13 x 2.42 x 2.81

## 2022-08-17 NOTE — PHYSICAL EXAM
[Alert] : alert [Well Nourished] : well nourished [No Acute Distress] : no acute distress [Normal Sclera/Conjunctiva] : normal sclera/conjunctiva [EOMI] : extra ocular movement intact [PERRL] : pupils equal, round and reactive to light [No Lid Lag] : no lid lag [Normal Outer Ear/Nose] : the ears and nose were normal in appearance [Normal Hearing] : hearing was normal [Normal TMs] : both tympanic membranes were normal [No Neck Mass] : no neck mass was observed [Thyroid Not Enlarged] : the thyroid was not enlarged [No Respiratory Distress] : no respiratory distress [Clear to Auscultation] : lungs were clear to auscultation bilaterally [Normal S1, S2] : normal S1 and S2 [Normal Rate] : heart rate was normal [Regular Rhythm] : with a regular rhythm [Normal Bowel Sounds] : normal bowel sounds [Not Tender] : non-tender [Soft] : abdomen soft [Normal Gait] : normal gait [No Clubbing, Cyanosis] : no clubbing  or cyanosis of the fingernails [No Joint Swelling] : no joint swelling seen [Normal Strength/Tone] : muscle strength and tone were normal [No Rash] : no rash [No Skin Lesions] : no skin lesions [No Motor Deficits] : the motor exam was normal [Normal Reflexes] : deep tendon reflexes were 2+ and symmetric [No Tremors] : no tremors [Oriented x3] : oriented to person, place, and time [Normal Affect] : the affect was normal [Normal Insight/Judgement] : insight and judgment were intact [Normal Mood] : the mood was normal

## 2022-08-17 NOTE — ASSESSMENT
[FreeTextEntry1] : 41 year old female with history of thyroid nodules here for f/u\par She has multiple cysts in bilateral lobes. \par However on the left she is noted to have a mixed nodule 3.13 x 2.42 x 2.81 cm, predominantly cystic \par (Noted to have slight enlargement) \par Increased compressive neck symptoms (hoarseness intermittently)\par \par -aspiration and ethanol ablation has been discussed with patient\par -Patient will think and will let me know her decision \par \par \par -Follow up in 6 months for repeat US

## 2022-08-17 NOTE — IMPRESSION
[FreeTextEntry1] : Left sided cyst 3.13 cm  [FreeTextEntry2] : Increased in size compared to before, continue to observe

## 2022-08-26 ENCOUNTER — APPOINTMENT (OUTPATIENT)
Dept: GASTROENTEROLOGY | Facility: CLINIC | Age: 42
End: 2022-08-26

## 2022-11-10 ENCOUNTER — APPOINTMENT (OUTPATIENT)
Dept: ENDOCRINOLOGY | Facility: CLINIC | Age: 42
End: 2022-11-10
Payer: COMMERCIAL

## 2022-11-10 VITALS
DIASTOLIC BLOOD PRESSURE: 80 MMHG | TEMPERATURE: 97.1 F | HEART RATE: 75 BPM | HEIGHT: 65 IN | OXYGEN SATURATION: 99 % | WEIGHT: 143 LBS | BODY MASS INDEX: 23.82 KG/M2 | SYSTOLIC BLOOD PRESSURE: 110 MMHG

## 2022-11-10 PROCEDURE — 10005 FNA BX W/US GDN 1ST LES: CPT

## 2022-11-10 PROCEDURE — 99213 OFFICE O/P EST LOW 20 MIN: CPT | Mod: 25

## 2022-11-10 PROCEDURE — J3490G: CUSTOM

## 2022-11-17 ENCOUNTER — TRANSCRIPTION ENCOUNTER (OUTPATIENT)
Age: 42
End: 2022-11-17

## 2022-11-17 ENCOUNTER — NON-APPOINTMENT (OUTPATIENT)
Age: 42
End: 2022-11-17

## 2022-11-17 LAB — FNA, THYROID: NORMAL

## 2022-11-18 ENCOUNTER — TRANSCRIPTION ENCOUNTER (OUTPATIENT)
Age: 42
End: 2022-11-18

## 2022-11-22 ENCOUNTER — TRANSCRIPTION ENCOUNTER (OUTPATIENT)
Age: 42
End: 2022-11-22

## 2022-11-23 ENCOUNTER — TRANSCRIPTION ENCOUNTER (OUTPATIENT)
Age: 42
End: 2022-11-23

## 2022-11-23 NOTE — HISTORY OF PRESENT ILLNESS
[FreeTextEntry1] : 41 year old female here for evaluation of thyroid nodules \par \par History of 2 biopsies and the last one was in 2019 \par She has been having check ups with an ultrasound \par \par Today here for ethanol ablation procedure. \par Thyroid Cyst today is measuring: 2.93 x 2.58 x 2.69 cm \par Reported having pressure, hoarseness at baseline

## 2022-11-23 NOTE — ASSESSMENT
[FreeTextEntry1] : 41 year old female with history of thyroid nodules here for f/u\par However on the left she is noted to have a cystic nodule 2.93 x 2.58 x 2.69 cm now s/p Ethanol ablation \par (Noted to have slight enlargement) \par Increased compressive neck symptoms (hoarseness intermittently)\par She tolerated the procedure well \par \par A 20 G needle, 10–25 mL syringe, and 95–99% ethanol was used for the procedure. \par 1.The patient was placed in a supine position on the bed with mild neck extension. \par 2.After skin sterilization, local anesthetic (1–2% lidocaine) is usually injected at the skin puncture site. \par 3. Needle was placed in the nodule \par 4.14 cc was aspirated\par 5. Then, injected 6 cc amount of 99% ethanol into the cystic space. The amount of ethanol injected depends on the amount of aspirated material in the nodule; approximately 50% of the aspirate volume is usually injected. \par 6. Ethanol was aspirated after 5-7 minutes \par \par \par -Follow up in 1month, 3 months and 6 months for repeat US

## 2022-11-23 NOTE — PHYSICAL EXAM
[Alert] : alert [Well Nourished] : well nourished [No Acute Distress] : no acute distress [Normal Sclera/Conjunctiva] : normal sclera/conjunctiva [PERRL] : pupils equal, round and reactive to light [Normal Outer Ear/Nose] : the ears and nose were normal in appearance [Normal TMs] : both tympanic membranes were normal [No Neck Mass] : no neck mass was observed [Thyroid Not Enlarged] : the thyroid was not enlarged [No Respiratory Distress] : no respiratory distress [Clear to Auscultation] : lungs were clear to auscultation bilaterally [Normal S1, S2] : normal S1 and S2 [Normal Rate] : heart rate was normal [Regular Rhythm] : with a regular rhythm [Normal Bowel Sounds] : normal bowel sounds [Not Tender] : non-tender [Soft] : abdomen soft [Normal Gait] : normal gait [No Joint Swelling] : no joint swelling seen [No Rash] : no rash [No Skin Lesions] : no skin lesions [No Motor Deficits] : the motor exam was normal [Normal Reflexes] : deep tendon reflexes were 2+ and symmetric [No Tremors] : no tremors [Oriented x3] : oriented to person, place, and time [Normal Affect] : the affect was normal [Normal Insight/Judgement] : insight and judgment were intact [Normal Mood] : the mood was normal

## 2022-11-23 NOTE — REVIEW OF SYSTEMS
[Dysphonia] : dysphonia [Fatigue] : no fatigue [Decreased Appetite] : appetite not decreased [Visual Field Defect] : no visual field defect [Dysphagia] : no dysphagia [Chest Pain] : no chest pain [Palpitations] : no palpitations [Shortness Of Breath] : no shortness of breath [Nausea] : no nausea [Vomiting] : no vomiting [Polyuria] : no polyuria [Irregular Menses] : regular menses [Joint Pain] : no joint pain [Muscle Weakness] : no muscle weakness [Acanthosis] : no acanthosis  [Acne] : no acne [Headaches] : no headaches [Dizziness] : no dizziness [Tremors] : no tremors [Depression] : no depression [Polydipsia] : no polydipsia [Cold Intolerance] : no cold intolerance [Easy Bleeding] : no ~M tendency for easy bleeding [Easy Bruising] : no tendency for easy bruising

## 2022-12-07 ENCOUNTER — APPOINTMENT (OUTPATIENT)
Dept: ENDOCRINOLOGY | Facility: CLINIC | Age: 42
End: 2022-12-07

## 2022-12-07 ENCOUNTER — NON-APPOINTMENT (OUTPATIENT)
Age: 42
End: 2022-12-07

## 2022-12-07 DIAGNOSIS — U07.1 COVID-19: ICD-10-CM

## 2022-12-07 RX ORDER — BENZONATATE 100 MG/1
100 CAPSULE ORAL 3 TIMES DAILY
Qty: 30 | Refills: 1 | Status: ACTIVE | COMMUNITY
Start: 2022-12-07 | End: 1900-01-01

## 2022-12-07 RX ORDER — ALBUTEROL SULFATE 90 UG/1
108 (90 BASE) INHALANT RESPIRATORY (INHALATION)
Qty: 1 | Refills: 2 | Status: ACTIVE | COMMUNITY
Start: 2022-12-07 | End: 1900-01-01

## 2022-12-21 ENCOUNTER — APPOINTMENT (OUTPATIENT)
Dept: ENDOCRINOLOGY | Facility: CLINIC | Age: 42
End: 2022-12-21
Payer: COMMERCIAL

## 2022-12-21 VITALS
HEIGHT: 65 IN | OXYGEN SATURATION: 99 % | WEIGHT: 143 LBS | SYSTOLIC BLOOD PRESSURE: 118 MMHG | BODY MASS INDEX: 23.82 KG/M2 | DIASTOLIC BLOOD PRESSURE: 70 MMHG | HEART RATE: 85 BPM

## 2022-12-21 PROCEDURE — 76536 US EXAM OF HEAD AND NECK: CPT

## 2022-12-21 PROCEDURE — 99213 OFFICE O/P EST LOW 20 MIN: CPT | Mod: 25

## 2023-02-07 NOTE — ASSESSMENT
[FreeTextEntry1] : 41 year old female with history of thyroid nodules here for f/u\par However on the left she is noted to have a cystic nodule 2.93 x 2.58 x 2.69 cm now s/p Ethanol ablation. Now one month post procedure the size has decreased to 1.48 x 0.97 x 1.15 cm.\par She tolerated the procedure well and now feeling back to baseline \par \par \par \par -Follow up in 3 months and 6 months for repeat US

## 2023-02-07 NOTE — IMPRESSION
[FreeTextEntry1] : Left lower pole cyst significantly decreased in size after ethanol ablation  [FreeTextEntry2] : Will follow up in 3 months

## 2023-02-07 NOTE — PROCEDURE
[[employer] 2008 model, 10-12 MHz frequencies] : multiple real time longitudinal and transverse images were obtained using a high resolution ultrasound with a linear transducer, [employer] e 2008 model, 10-12 MHz frequencies. All measurements will be reported as longitudinal x alden-posterior x transverse. [Thyroid Nodule] : thyroid nodule [] : a homogeneous parenchyma [Left Thyroid] : left [Lower] : lower pole there is a  [Cyst] : cyst [Ovoid] : ovoid in shape [Smooth] : smooth [Thin] : has a thin halo [Peripheral vascularity] : peripheral vascularity [2] : 2 [No abnormal lymph nodes are seen.] : no abnormal lymph nodes are seen [FreeTextEntry1] : 5.9 x 2.14 x 2.58 [FreeTextEntry5] : 6.3 x 1.93 x 3.12 [FreeTextEntry2] : 0.58 [FreeTextEntry3] : 1.46 x 0.97 x 1.15

## 2023-02-07 NOTE — PROCEDURE
[[employer] e 2008 model, 10-12 MHz frequencies] : multiple real time longitudinal and transverse images were obtained using a high resolution ultrasound with a linear transducer, [employer] e 2008 model, 10-12 MHz frequencies. All measurements will be reported as longitudinal x alden-posterior x transverse. [Thyroid Nodule] : thyroid nodule [] : a homogeneous parenchyma [Left Thyroid] : left [Lower] : lower pole there is a  [Cyst] : cyst [Ovoid] : ovoid in shape [Smooth] : smooth [Thin] : has a thin halo [Peripheral vascularity] : peripheral vascularity [2] : 2 [No abnormal lymph nodes are seen.] : no abnormal lymph nodes are seen [FreeTextEntry1] : 5.9 x 2.14 x 2.58 [FreeTextEntry5] : 6.3 x 1.93 x 3.12 [FreeTextEntry2] : 0.58 [FreeTextEntry3] : 1.46 x 0.97 x 1.15

## 2023-02-07 NOTE — HISTORY OF PRESENT ILLNESS
[FreeTextEntry1] : 42 year old female here for follow up post ethanol ablation. \par She reported that she had burning in her anterior neck 2-3 days post procedure. \par Now feeling significantly better.\par Improvement in dysphagia, neck pain, neck pressure and dysphonia.

## 2023-03-13 ENCOUNTER — APPOINTMENT (OUTPATIENT)
Dept: INTERNAL MEDICINE | Facility: CLINIC | Age: 43
End: 2023-03-13
Payer: COMMERCIAL

## 2023-03-13 ENCOUNTER — NON-APPOINTMENT (OUTPATIENT)
Age: 43
End: 2023-03-13

## 2023-03-13 DIAGNOSIS — G43.109 MIGRAINE WITH AURA, NOT INTRACTABLE, W/OUT STATUS MIGRAINOSUS: ICD-10-CM

## 2023-03-13 DIAGNOSIS — H53.9 UNSPECIFIED VISUAL DISTURBANCE: ICD-10-CM

## 2023-03-13 PROCEDURE — 99214 OFFICE O/P EST MOD 30 MIN: CPT | Mod: 25

## 2023-03-13 PROCEDURE — 93000 ELECTROCARDIOGRAM COMPLETE: CPT | Mod: 59

## 2023-03-14 NOTE — HISTORY OF PRESENT ILLNESS
[de-identified] : 42yoF PMH MNG, DJD spine presents with ocular migraine\par \par last Wednesday, endorses change in right vision, describes as "C-shaped" and "kaleidoscopic," followed by posterior headache and left arm achiness - lasted 2 hours\par \par similar symptoms started today with "kaleidoscopic" vision changes left eye, followed by frontal headache and left arm and leg achiness\par denies vision loss, motor or sensory deficits\par has history of migraine but never had visual aura\par seen by ophthalmology last week - unremarkable exam, was told likely ocular migraine\par \par denies sleep deprivation, denies increased stress, no changes in daily habits

## 2023-03-14 NOTE — REVIEW OF SYSTEMS
[Vision Problems] : vision problems [Muscle Pain] : muscle pain [Headache] : headache [Negative] : Heme/Lymph

## 2023-03-16 ENCOUNTER — APPOINTMENT (OUTPATIENT)
Dept: ENDOCRINOLOGY | Facility: CLINIC | Age: 43
End: 2023-03-16
Payer: COMMERCIAL

## 2023-03-16 VITALS
SYSTOLIC BLOOD PRESSURE: 130 MMHG | HEART RATE: 99 BPM | OXYGEN SATURATION: 99 % | WEIGHT: 139 LBS | HEIGHT: 65 IN | DIASTOLIC BLOOD PRESSURE: 70 MMHG | BODY MASS INDEX: 23.16 KG/M2

## 2023-03-16 PROCEDURE — 76536 US EXAM OF HEAD AND NECK: CPT | Mod: 52

## 2023-03-16 PROCEDURE — 99214 OFFICE O/P EST MOD 30 MIN: CPT | Mod: 25

## 2023-03-16 NOTE — ASSESSMENT
[FreeTextEntry1] : 42 year old female with history of thyroid nodules here for f/u\par However on the left she is noted to have a cystic nodule 2.93 x 2.58 x 2.69 cm now s/p Ethanol ablation. Now one month post procedure the size has decreased to 1.15 x 0.73 x 0.92 cm. ( appearing mixed/with isoechoic solid component)\par She tolerated the procedure well and now feeling back to baseline \par \par \par \par -Follow up in 6 months for repeat US

## 2023-03-16 NOTE — REVIEW OF SYSTEMS
[Fatigue] : no fatigue [Decreased Appetite] : appetite not decreased [Recent Weight Gain (___ Lbs)] : no recent weight gain [Recent Weight Loss (___ Lbs)] : no recent weight loss [Visual Field Defect] : no visual field defect [Dry Eyes] : no dryness [Dysphagia] : no dysphagia [Neck Pain] : no neck pain [Dysphonia] : no dysphonia [Nasal Congestion] : no nasal congestion [Chest Pain] : no chest pain [Slow Heart Rate] : heart rate is not slow [Palpitations] : no palpitations [Fast Heart Rate] : heart rate is not fast [Shortness Of Breath] : no shortness of breath [Cough] : no cough [Nausea] : no nausea [Constipation] : no constipation [Vomiting] : no vomiting [Diarrhea] : no diarrhea [Polyuria] : no polyuria [Irregular Menses] : regular menses [Joint Pain] : no joint pain [Acanthosis] : no acanthosis  [Acne] : no acne [Headaches] : no headaches [Dizziness] : no dizziness [Tremors] : no tremors [Depression] : no depression [Polydipsia] : no polydipsia [Cold Intolerance] : no cold intolerance [Easy Bleeding] : no ~M tendency for easy bleeding [Easy Bruising] : no tendency for easy bruising

## 2023-03-30 ENCOUNTER — TRANSCRIPTION ENCOUNTER (OUTPATIENT)
Age: 43
End: 2023-03-30

## 2023-03-31 ENCOUNTER — TRANSCRIPTION ENCOUNTER (OUTPATIENT)
Age: 43
End: 2023-03-31

## 2023-03-31 DIAGNOSIS — R09.82 POSTNASAL DRIP: ICD-10-CM

## 2023-03-31 RX ORDER — FLUTICASONE PROPIONATE 50 UG/1
50 SPRAY, METERED NASAL TWICE DAILY
Qty: 1 | Refills: 3 | Status: ACTIVE | COMMUNITY
Start: 2023-03-31 | End: 1900-01-01

## 2023-04-03 ENCOUNTER — NON-APPOINTMENT (OUTPATIENT)
Age: 43
End: 2023-04-03

## 2023-04-03 ENCOUNTER — APPOINTMENT (OUTPATIENT)
Dept: PHYSICAL MEDICINE AND REHAB | Facility: CLINIC | Age: 43
End: 2023-04-03
Payer: COMMERCIAL

## 2023-04-03 VITALS
HEART RATE: 75 BPM | TEMPERATURE: 97.3 F | DIASTOLIC BLOOD PRESSURE: 75 MMHG | OXYGEN SATURATION: 97 % | SYSTOLIC BLOOD PRESSURE: 123 MMHG

## 2023-04-03 PROCEDURE — 99203 OFFICE O/P NEW LOW 30 MIN: CPT

## 2023-04-03 RX ORDER — CYCLOBENZAPRINE HYDROCHLORIDE 5 MG/1
5 TABLET, FILM COATED ORAL
Qty: 30 | Refills: 1 | Status: ACTIVE | COMMUNITY
Start: 2022-05-21 | End: 1900-01-01

## 2023-04-03 RX ORDER — MELOXICAM 7.5 MG/1
7.5 TABLET ORAL TWICE DAILY
Qty: 60 | Refills: 0 | Status: ACTIVE | COMMUNITY
Start: 2023-04-03 | End: 1900-01-01

## 2023-04-03 NOTE — HISTORY OF PRESENT ILLNESS
[FreeTextEntry1] : 43 yo presents with neck pain \par In 2016 began to have neck pain, + radiating to the L>R, MRI 2016- nothing acute \par Last month started to have pain again, has Restricted movement \par + pain 3,4,5 digits  tingling/ numbness\par When has pain, difficulty yo use lefty hand \par No  bowel or bladder changes. No trauma. \par \par Has tried muscle relaxers, diclofenac

## 2023-04-03 NOTE — PHYSICAL EXAM
[FreeTextEntry1] : General Appearance: Well developed, well nourished, in no acute distress.\par Skin: Inspection of the skin reveals no rashes or ulcerations.\par Chest: Normal chest expansion.\par Musculoskeletal: There is tenderness over cervical  paraspinals. Decreased range of motion of the cervical spine. Extremities: No cyanosis, clubbing or edema.\par Neurologic: Cranial nerves are intact The patient has normal muscle strength in bilateral upper and lower extremities. No expressive or receptive aphasia. Sensation is intact. Gait is steady. No Babinski, Roman or clonus.\par

## 2023-04-03 NOTE — ASSESSMENT
[FreeTextEntry1] : Neck pain\par - PT for strengthening cervical paraspinals\par -Meloxicam, cyclobenzaprine for pain\par -trigger point injection if needed

## 2023-04-28 ENCOUNTER — RX RENEWAL (OUTPATIENT)
Age: 43
End: 2023-04-28

## 2023-04-28 RX ORDER — DICLOFENAC SODIUM 1% 10 MG/G
1 GEL TOPICAL DAILY
Qty: 100 | Refills: 0 | Status: ACTIVE | COMMUNITY
Start: 2023-03-31 | End: 1900-01-01

## 2023-05-15 ENCOUNTER — APPOINTMENT (OUTPATIENT)
Dept: PAIN MANAGEMENT | Facility: CLINIC | Age: 43
End: 2023-05-15
Payer: COMMERCIAL

## 2023-05-15 ENCOUNTER — NON-APPOINTMENT (OUTPATIENT)
Age: 43
End: 2023-05-15

## 2023-05-15 ENCOUNTER — APPOINTMENT (OUTPATIENT)
Dept: PHYSICAL MEDICINE AND REHAB | Facility: CLINIC | Age: 43
End: 2023-05-15
Payer: COMMERCIAL

## 2023-05-15 VITALS
SYSTOLIC BLOOD PRESSURE: 135 MMHG | HEART RATE: 80 BPM | TEMPERATURE: 97.2 F | DIASTOLIC BLOOD PRESSURE: 78 MMHG | OXYGEN SATURATION: 100 %

## 2023-05-15 VITALS
BODY MASS INDEX: 22.99 KG/M2 | HEIGHT: 65 IN | SYSTOLIC BLOOD PRESSURE: 136 MMHG | HEART RATE: 70 BPM | DIASTOLIC BLOOD PRESSURE: 88 MMHG | WEIGHT: 138 LBS

## 2023-05-15 DIAGNOSIS — M50.20 OTHER CERVICAL DISC DISPLACEMENT, UNSPECIFIED CERVICAL REGION: ICD-10-CM

## 2023-05-15 PROCEDURE — 99213 OFFICE O/P EST LOW 20 MIN: CPT

## 2023-05-15 PROCEDURE — 99214 OFFICE O/P EST MOD 30 MIN: CPT

## 2023-05-15 PROCEDURE — 99204 OFFICE O/P NEW MOD 45 MIN: CPT

## 2023-05-15 RX ORDER — TOPIRAMATE 25 MG/1
25 TABLET, FILM COATED ORAL
Qty: 120 | Refills: 2 | Status: ACTIVE | COMMUNITY
Start: 2023-05-15 | End: 1900-01-01

## 2023-05-15 NOTE — PHYSICAL EXAM
[General Appearance - Alert] : alert [General Appearance - In No Acute Distress] : in no acute distress [General Appearance - Well Nourished] : well nourished [General Appearance - Well Developed] : well developed [General Appearance - Well-Appearing] : healthy appearing [Oriented To Time, Place, And Person] : oriented to person, place, and time [Impaired Insight] : insight and judgment were intact [Affect] : the affect was normal [Mood] : the mood was normal [Memory Recent] : recent memory was not impaired [Memory Remote] : remote memory was not impaired [Cranial Nerves Facial (VII)] : face symmetrical [Cranial Nerves Accessory (XI - Cranial And Spinal)] : head turning and shoulder shrug symmetric [Cranial Nerves Hypoglossal (XII)] : there was no tongue deviation with protrusion [Motor Strength] : muscle strength was normal in all four extremities [Involuntary Movements] : no involuntary movements were seen [No Muscle Atrophy] : normal bulk in all four extremities [Paresis Pronator Drift Right-Sided] : no pronator drift on the right [Paresis Pronator Drift Left-Sided] : no pronator drift on the left [Motor Strength Upper Extremities Bilaterally] : strength was normal in both upper extremities [Motor Strength Lower Extremities Bilaterally] : strength was normal in both lower extremities [Coordination - Dysmetria Impaired Finger-to-Nose Bilateral] : not present [2+] : Patella left 2+ [Sclera] : the sclera and conjunctiva were normal [PERRL With Normal Accommodation] : pupils were equal in size, round, reactive to light, with normal accommodation [Extraocular Movements] : extraocular movements were intact [] : no respiratory distress [Edema] : there was no peripheral edema [Abnormal Walk] : normal gait

## 2023-05-15 NOTE — ASSESSMENT
[FreeTextEntry1] : 42 year old woman with migraine with aura and numbness to left arm and leg during migraine . \par Trial Topamax to prevent migraine - we discussed potential adverse effects.\par Trial of prn Ubrelvy to abort migraine \par Headache hygiene\par RTO2-3months \par \par

## 2023-05-15 NOTE — HISTORY OF PRESENT ILLNESS
[Headache] : headache [Aura] : aura [Photophobia] : photophobia [Phonophobia] : phonophobia [Neck Pain] : neck pain [Numbness] : numbness [Tingling] : tingling [> 4 hours] : > 4 hours [FreeTextEntry1] : PMH - asthma and thyroid nodules , neck pain with herniated disc. \par Hx of of migraine in the past but on March 8 had a visual aura for the first time - this aura occurred for 4 weeks several days/ week . Now it has decreased . \par Migraine is usually left sided . \par Meds tried - Motrin \par Family hx of migraine -none\par Caffeine - 1 cup / day \par Water intake - trying \par Sleep- 7 h/ night\par Concussion hx - none \par MRI - WNL \par \par Lives with family -  and 4 daughters\par Denies smoking , ETOH - rare \par Unsure of trigger . \par Both parent with hx of migraine  [Nausea] : no nausea [de-identified] : Left arm and leg numbness during a migraine.

## 2023-05-22 PROBLEM — M50.20 HERNIATED CERVICAL DISC WITHOUT MYELOPATHY: Status: ACTIVE | Noted: 2022-05-29

## 2023-05-22 NOTE — REVIEW OF SYSTEMS
[Negative] : Gastrointestinal [FreeTextEntry9] : Posterior neck pain and Right clavicle pain [de-identified] : Tingling to Right hand 3rd-5th digits intermittently

## 2023-05-22 NOTE — ASSESSMENT
[FreeTextEntry1] : Ms. NORMA FULLER is a 43yo Female who presents for follow up regarding chronic posterior neck pain, L>R. Since her previous appointment on 04/03/2023, she has been participating in PT & HEP with improvement of initial posterior neck pain. However, she now reports new Right-sided sternoclavicular pain in the setting of lifting a watering can; on exam has tasneem prominence at the Right sternoclavicular joint space and TTP. Will recommend:\par \par 1. Orthopedic  referral for further workup regarding Right sternoclavicular pain\par 2. continue PT and HEP for posterior neck pain\par 3. RTC as needed

## 2023-05-22 NOTE — HISTORY OF PRESENT ILLNESS
[FreeTextEntry1] : Ms. NORMA FULLER is a 43yo Female who presents for follow up regarding chronic posterior neck pain, L>R. She was last seen in the office on 04/03/2023; she was prescribed course of Meloxicam and Cyclobenzaprine, and was started on PT. \par \par Patient returns to clinic this afternoon for follow-up and reports significant improvement in her symptoms. She states that her pain while previously rated 10/10 at worse, has now improved to 3-5/10. In particular, she has had noticeable improvement in neck rotation to the Left. She continues with PT and is performing HEP provided by her therapists. \par \par However, she reports that shortly after our visit, she was picking up a watering can and experienced acute sternoclavicular pain that radiated to her Right shoulder; reports initial swelling and tenderness to palpation of the Right clavicle. Her pain is now improved to her Right clavicle, but still present and restricts her ROM. At the time, she was taking Meloxicam and cyclobenzaprine for pain; last taken ~3 weeks ago. Reports intermittent episodes of tingling to her 3rd-5th digits on the Right hand, but otherwise, does not report new numbness, tingling, bowel/bladder incontinence, or trauma.

## 2023-05-26 ENCOUNTER — APPOINTMENT (OUTPATIENT)
Dept: ORTHOPEDIC SURGERY | Facility: CLINIC | Age: 43
End: 2023-05-26
Payer: COMMERCIAL

## 2023-05-26 VITALS
SYSTOLIC BLOOD PRESSURE: 114 MMHG | DIASTOLIC BLOOD PRESSURE: 70 MMHG | TEMPERATURE: 98 F | OXYGEN SATURATION: 99 % | HEIGHT: 65 IN | WEIGHT: 138 LBS | BODY MASS INDEX: 22.99 KG/M2 | HEART RATE: 73 BPM

## 2023-05-26 DIAGNOSIS — M25.511 PAIN IN RIGHT SHOULDER: ICD-10-CM

## 2023-05-26 DIAGNOSIS — M25.519 PAIN IN UNSPECIFIED SHOULDER: ICD-10-CM

## 2023-05-26 PROCEDURE — 73000 X-RAY EXAM OF COLLAR BONE: CPT | Mod: RT

## 2023-05-26 PROCEDURE — 99203 OFFICE O/P NEW LOW 30 MIN: CPT

## 2023-06-22 ENCOUNTER — NON-APPOINTMENT (OUTPATIENT)
Age: 43
End: 2023-06-22

## 2023-06-23 ENCOUNTER — TRANSCRIPTION ENCOUNTER (OUTPATIENT)
Age: 43
End: 2023-06-23

## 2023-06-25 ENCOUNTER — TRANSCRIPTION ENCOUNTER (OUTPATIENT)
Age: 43
End: 2023-06-25

## 2023-06-26 ENCOUNTER — APPOINTMENT (OUTPATIENT)
Dept: OBGYN | Facility: CLINIC | Age: 43
End: 2023-06-26
Payer: COMMERCIAL

## 2023-06-26 VITALS
WEIGHT: 138 LBS | HEART RATE: 82 BPM | TEMPERATURE: 98.5 F | DIASTOLIC BLOOD PRESSURE: 79 MMHG | HEIGHT: 65 IN | RESPIRATION RATE: 16 BRPM | OXYGEN SATURATION: 98 % | SYSTOLIC BLOOD PRESSURE: 124 MMHG | BODY MASS INDEX: 22.99 KG/M2

## 2023-06-26 DIAGNOSIS — Z01.411 ENCOUNTER FOR GYNECOLOGICAL EXAMINATION (GENERAL) (ROUTINE) WITH ABNORMAL FINDINGS: ICD-10-CM

## 2023-06-26 DIAGNOSIS — N93.0 POSTCOITAL AND CONTACT BLEEDING: ICD-10-CM

## 2023-06-26 PROCEDURE — 99386 PREV VISIT NEW AGE 40-64: CPT

## 2023-06-26 PROCEDURE — 99212 OFFICE O/P EST SF 10 MIN: CPT | Mod: 25

## 2023-06-26 NOTE — HISTORY OF PRESENT ILLNESS
[FreeTextEntry1] : 41yo P4 LMP 6/15/23 here for annual gyn exam and irregular menses, \par pap- few yrs ago, wnl. denies hx of abnormal pap\par mammo- 1y ago, wnl. \par \par hx of heavy menses since 2019. last year had endometrial polyp removal. since then period was normal. \par 3/2023, had postcoital bleeding and then again 4/2023. also has intermenstrual spotting for the past 2 months. \par \par sees pcp regularly.\par \par lives with family.\par homemaker. \par \par

## 2023-06-27 ENCOUNTER — APPOINTMENT (OUTPATIENT)
Dept: INTERNAL MEDICINE | Facility: CLINIC | Age: 43
End: 2023-06-27
Payer: COMMERCIAL

## 2023-06-27 DIAGNOSIS — R49.0 DYSPHONIA: ICD-10-CM

## 2023-06-27 PROCEDURE — 99442: CPT

## 2023-06-28 LAB
C TRACH RRNA SPEC QL NAA+PROBE: NOT DETECTED
HPV HIGH+LOW RISK DNA PNL CVX: NOT DETECTED
N GONORRHOEA RRNA SPEC QL NAA+PROBE: NOT DETECTED
SOURCE TP AMPLIFICATION: NORMAL

## 2023-06-29 LAB — CYTOLOGY CVX/VAG DOC THIN PREP: NORMAL

## 2023-06-30 PROBLEM — R49.0 HOARSENESS, CHRONIC: Status: ACTIVE | Noted: 2023-06-27

## 2023-06-30 LAB
SOURCE AMPLIFICATION: NORMAL
T VAGINALIS RRNA SPEC QL NAA+PROBE: NOT DETECTED

## 2023-06-30 NOTE — HISTORY OF PRESENT ILLNESS
[Home] : at home, [unfilled] , at the time of the visit. [Medical Office: ([facility])___] : at the medical office located in  [Verbal consent obtained from patient] : the patient, [unfilled] [de-identified] : 42yoF PMH MNG, DJD spine presents with throat pain\par \par last Wednesday, endorses change in right vision, describes as "C-shaped" and "kaleidoscopic," followed by posterior headache and left arm achiness - lasted 2 hours\par \par similar symptoms started today with "kaleidoscopic" vision changes left eye, followed by frontal headache and left arm and leg achiness\par denies vision loss, motor or sensory deficits\par has history of migraine but never had visual aura\par seen by ophthalmology last week - unremarkable exam, was told likely ocular migraine\par \par denies sleep deprivation, denies increased stress, no changes in daily habits\par \par 6/27:\par endorses recurrent hoarseness and throat pain\par endo states not 2/2 ETOH ablation of thyroid cyst\par denies fever, chills, body aches, chest pain, sob\par \par under more stress recently - sister in law diagnosed with gastric cancer

## 2023-07-10 ENCOUNTER — APPOINTMENT (OUTPATIENT)
Dept: ORTHOPEDIC SURGERY | Facility: CLINIC | Age: 43
End: 2023-07-10

## 2023-07-27 ENCOUNTER — APPOINTMENT (OUTPATIENT)
Dept: OBGYN | Facility: CLINIC | Age: 43
End: 2023-07-27
Payer: COMMERCIAL

## 2023-07-27 VITALS
DIASTOLIC BLOOD PRESSURE: 77 MMHG | OXYGEN SATURATION: 100 % | BODY MASS INDEX: 22.82 KG/M2 | WEIGHT: 137 LBS | SYSTOLIC BLOOD PRESSURE: 132 MMHG | RESPIRATION RATE: 14 BRPM | TEMPERATURE: 98.7 F | HEIGHT: 65 IN | HEART RATE: 66 BPM

## 2023-07-27 DIAGNOSIS — N84.0 POLYP OF CORPUS UTERI: ICD-10-CM

## 2023-07-27 PROCEDURE — 99213 OFFICE O/P EST LOW 20 MIN: CPT

## 2023-07-27 NOTE — PLAN
[FreeTextEntry1] : Her condition and treatment options including expectant management vs. surgical management was discussed. Through shared decision making, we decided to proceed with D&C hysteroscopy.. Procedure was explained to patient. Risks, benefits and expected outcome of the procedure was discussed with the patient. Patient had opportunity to ask questions and all of her questions were answered to her satisfaction. Patient verbalized understanding of the above discussion. Presurgical testing instructions were provided. Booking sheet was submitted for planning. \par \par RTC postop or sooner prn\par Yuri CULP\par

## 2023-07-27 NOTE — HISTORY OF PRESENT ILLNESS
[FreeTextEntry1] : pt here for follow up of postcoital bleeding.\par reports continues to have irregular postcoital bleeding.\par no new complaints. \par \par sonohysterogram showing endometrial polyp.

## 2023-08-03 ENCOUNTER — APPOINTMENT (OUTPATIENT)
Dept: PAIN MANAGEMENT | Facility: CLINIC | Age: 43
End: 2023-08-03
Payer: COMMERCIAL

## 2023-08-03 VITALS
HEIGHT: 65 IN | WEIGHT: 137 LBS | HEART RATE: 71 BPM | DIASTOLIC BLOOD PRESSURE: 80 MMHG | SYSTOLIC BLOOD PRESSURE: 126 MMHG | BODY MASS INDEX: 22.82 KG/M2

## 2023-08-03 DIAGNOSIS — G43.109 MIGRAINE WITH AURA, NOT INTRACTABLE, W/OUT STATUS MIGRAINOSUS: ICD-10-CM

## 2023-08-03 DIAGNOSIS — M62.838 OTHER MUSCLE SPASM: ICD-10-CM

## 2023-08-03 PROCEDURE — 99204 OFFICE O/P NEW MOD 45 MIN: CPT

## 2023-08-03 RX ORDER — UBROGEPANT 100 MG/1
TABLET ORAL
Qty: 10 | Refills: 2 | Status: ACTIVE | COMMUNITY
Start: 2023-05-15 | End: 1900-01-01

## 2023-08-03 RX ORDER — OMEPRAZOLE 40 MG/1
40 CAPSULE, DELAYED RELEASE ORAL
Refills: 0 | Status: ACTIVE | COMMUNITY

## 2023-08-03 NOTE — HISTORY OF PRESENT ILLNESS
[FreeTextEntry1] : Pt is 41 yo woman who notes a history of headache since high school.  However, they signficantly changed in spring of this year when she had onset of visual changes - "like a kaleidoscope" and takes 30-35 minute to resolve.  had been unable to see ot center.   This visual change has been recurrent and up to 20 min. When it occurs now she can see less severe forms. Form March to May had pain on left side and would get weak on lef.t Now can get on right side at more moderate level. Has not had weakness in arms for about 2 months. Still can get visual change on side wiht visual change. Had estefania on topiramate for last few months but off the last few weeks and no more severe. Has used the ubrelvy and using it well. Does have some neck problems and can see association with neck.  Has been going to therapy for that as well. Some grinding at night and tender in jaw.  has a mouth guard but not ground through.  Slight lateral movement.  [Headache] : headache [Aura] : aura [Nausea] : nausea [Photophobia] : photophobia [Phonophobia] : phonophobia [Scalp Tenderness] : scalp tenderness [> 4 hours] : > 4 hours [stayed the same] : stayed the same [Stable] : The patient reports ~his/her~ symptoms since the last visit are stable

## 2023-08-04 ENCOUNTER — OUTPATIENT (OUTPATIENT)
Dept: OUTPATIENT SERVICES | Facility: HOSPITAL | Age: 43
LOS: 1 days | End: 2023-08-04
Payer: COMMERCIAL

## 2023-08-04 VITALS
HEART RATE: 75 BPM | HEIGHT: 65 IN | SYSTOLIC BLOOD PRESSURE: 131 MMHG | OXYGEN SATURATION: 100 % | RESPIRATION RATE: 18 BRPM | DIASTOLIC BLOOD PRESSURE: 70 MMHG | WEIGHT: 138.01 LBS | TEMPERATURE: 99 F

## 2023-08-04 DIAGNOSIS — Z98.890 OTHER SPECIFIED POSTPROCEDURAL STATES: Chronic | ICD-10-CM

## 2023-08-04 DIAGNOSIS — Z90.49 ACQUIRED ABSENCE OF OTHER SPECIFIED PARTS OF DIGESTIVE TRACT: Chronic | ICD-10-CM

## 2023-08-04 DIAGNOSIS — Z86.39 PERSONAL HISTORY OF OTHER ENDOCRINE, NUTRITIONAL AND METABOLIC DISEASE: ICD-10-CM

## 2023-08-04 DIAGNOSIS — N84.0 POLYP OF CORPUS UTERI: ICD-10-CM

## 2023-08-04 DIAGNOSIS — N84.0 POLYP OF CORPUS UTERI: Chronic | ICD-10-CM

## 2023-08-04 DIAGNOSIS — Z53.1 PROCEDURE AND TREATMENT NOT CARRIED OUT BECAUSE OF PATIENT'S DECISION FOR REASONS OF BELIEF AND GROUP PRESSURE: ICD-10-CM

## 2023-08-04 DIAGNOSIS — J45.909 UNSPECIFIED ASTHMA, UNCOMPLICATED: ICD-10-CM

## 2023-08-04 DIAGNOSIS — Z01.818 ENCOUNTER FOR OTHER PREPROCEDURAL EXAMINATION: ICD-10-CM

## 2023-08-04 DIAGNOSIS — K21.9 GASTRO-ESOPHAGEAL REFLUX DISEASE WITHOUT ESOPHAGITIS: ICD-10-CM

## 2023-08-04 LAB
ANION GAP SERPL CALC-SCNC: 6 MMOL/L — SIGNIFICANT CHANGE UP (ref 5–17)
APPEARANCE UR: CLEAR — SIGNIFICANT CHANGE UP
APTT BLD: 31 SEC — SIGNIFICANT CHANGE UP (ref 24.5–35.6)
BACTERIA # UR AUTO: ABNORMAL /HPF
BILIRUB UR-MCNC: NEGATIVE — SIGNIFICANT CHANGE UP
BUN SERPL-MCNC: 9 MG/DL — SIGNIFICANT CHANGE UP (ref 7–18)
CALCIUM SERPL-MCNC: 8.7 MG/DL — SIGNIFICANT CHANGE UP (ref 8.4–10.5)
CHLORIDE SERPL-SCNC: 110 MMOL/L — HIGH (ref 96–108)
CO2 SERPL-SCNC: 23 MMOL/L — SIGNIFICANT CHANGE UP (ref 22–31)
COLOR SPEC: YELLOW — SIGNIFICANT CHANGE UP
CREAT SERPL-MCNC: 0.71 MG/DL — SIGNIFICANT CHANGE UP (ref 0.5–1.3)
DIFF PNL FLD: ABNORMAL
EGFR: 109 ML/MIN/1.73M2 — SIGNIFICANT CHANGE UP
EPI CELLS # UR: ABNORMAL /HPF
GLUCOSE SERPL-MCNC: 74 MG/DL — SIGNIFICANT CHANGE UP (ref 70–99)
GLUCOSE UR QL: NEGATIVE — SIGNIFICANT CHANGE UP
HCT VFR BLD CALC: 33.5 % — LOW (ref 34.5–45)
HGB BLD-MCNC: 10.3 G/DL — LOW (ref 11.5–15.5)
INR BLD: 1.04 RATIO — SIGNIFICANT CHANGE UP (ref 0.85–1.18)
KETONES UR-MCNC: NEGATIVE — SIGNIFICANT CHANGE UP
LEUKOCYTE ESTERASE UR-ACNC: NEGATIVE — SIGNIFICANT CHANGE UP
MCHC RBC-ENTMCNC: 24.1 PG — LOW (ref 27–34)
MCHC RBC-ENTMCNC: 30.7 GM/DL — LOW (ref 32–36)
MCV RBC AUTO: 78.3 FL — LOW (ref 80–100)
NITRITE UR-MCNC: NEGATIVE — SIGNIFICANT CHANGE UP
NRBC # BLD: 0 /100 WBCS — SIGNIFICANT CHANGE UP (ref 0–0)
PH UR: 5 — SIGNIFICANT CHANGE UP (ref 5–8)
PLATELET # BLD AUTO: 286 K/UL — SIGNIFICANT CHANGE UP (ref 150–400)
POTASSIUM SERPL-MCNC: 3.6 MMOL/L — SIGNIFICANT CHANGE UP (ref 3.5–5.3)
POTASSIUM SERPL-SCNC: 3.6 MMOL/L — SIGNIFICANT CHANGE UP (ref 3.5–5.3)
PROT UR-MCNC: 30 MG/DL
PROTHROM AB SERPL-ACNC: 11.8 SEC — SIGNIFICANT CHANGE UP (ref 9.5–13)
RBC # BLD: 4.28 M/UL — SIGNIFICANT CHANGE UP (ref 3.8–5.2)
RBC # FLD: 16.5 % — HIGH (ref 10.3–14.5)
RBC CASTS # UR COMP ASSIST: SIGNIFICANT CHANGE UP /HPF (ref 0–2)
SODIUM SERPL-SCNC: 139 MMOL/L — SIGNIFICANT CHANGE UP (ref 135–145)
SP GR SPEC: 1.02 — SIGNIFICANT CHANGE UP (ref 1.01–1.02)
UROBILINOGEN FLD QL: NEGATIVE — SIGNIFICANT CHANGE UP
WBC # BLD: 4.86 K/UL — SIGNIFICANT CHANGE UP (ref 3.8–10.5)
WBC # FLD AUTO: 4.86 K/UL — SIGNIFICANT CHANGE UP (ref 3.8–10.5)
WBC UR QL: SIGNIFICANT CHANGE UP /HPF (ref 0–5)

## 2023-08-04 PROCEDURE — G0463: CPT

## 2023-08-04 RX ORDER — SODIUM CHLORIDE 9 MG/ML
3 INJECTION INTRAMUSCULAR; INTRAVENOUS; SUBCUTANEOUS EVERY 8 HOURS
Refills: 0 | Status: DISCONTINUED | OUTPATIENT
Start: 2023-08-11 | End: 2023-08-25

## 2023-08-04 NOTE — H&P PST ADULT - HISTORY OF PRESENT ILLNESS
42 yr old pleasant female with history of stable asthma on inhaler (not intubated or hospitalized) GERD goiter being observed by endocrinologist, presents having vaginal bleeding started in March and April during intercourse than spotting before and after her cycle. Pt seen by gyn and scheduled D & C Hysteroscopy on 8/11/2023. Pt is a [employer] health care proxy on chart and will complete blood refusal forms and bring in day of surgery.

## 2023-08-04 NOTE — H&P PST ADULT - PROBLEM SELECTOR PLAN 3
scheduled for D & C with hysteroscopy    Pt instructed to be NPO the night before and morning of surgery except for po med Provided with chlorhexidene 4% solution to wash for 3 days including the morning of surgery. Written instructions given and reviewed with pt for understanding. Escort required post procedure. Tylenol only to be used the week prior to surgery if needed.    Stop Bang Score=1 Pt is low risk for NAKITA

## 2023-08-04 NOTE — H&P PST ADULT - PROBLEM SELECTOR PLAN 4
Pt has Healthy care Proxy on chart and will completed blood refusal form and bring morning of surgery.

## 2023-08-04 NOTE — H&P PST ADULT - ASSESSMENT
42 yr old pleasant female with history of stable asthma on inhaler (not intubated or hospitalized) GERD goiter being observed by endocrinologist, presents having vaginal bleeding started in March and April during intercourse than spotting before and after her cycle. Pt seen by gyn and scheduled D & C Hysteroscopy on 8/11/2023. 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

## 2023-08-04 NOTE — H&P PST ADULT - NSICDXFAMILYHX_GEN_ALL_CORE_FT
FAMILY HISTORY:  Mother  Still living? Yes, Estimated age: 75  FH: HTN (hypertension), Age at diagnosis: Age Unknown

## 2023-08-04 NOTE — H&P PST ADULT - NSICDXPASTMEDICALHX_GEN_ALL_CORE_FT
PAST MEDICAL HISTORY:  Asthma     GERD (gastroesophageal reflux disease)     History of goiter     Lower Back Pain     Polyp of corpus uteri     Refusal of Blood Transfusions as Patient Is [employer]

## 2023-08-04 NOTE — H&P PST ADULT - NSANTHOSAYNRD_GEN_A_CORE
No. NAKITA screening performed.  STOP BANG Legend: 0-2 = LOW Risk; 3-4 = INTERMEDIATE Risk; 5-8 = HIGH Risk

## 2023-08-10 ENCOUNTER — TRANSCRIPTION ENCOUNTER (OUTPATIENT)
Age: 43
End: 2023-08-10

## 2023-08-11 ENCOUNTER — RESULT REVIEW (OUTPATIENT)
Age: 43
End: 2023-08-11

## 2023-08-11 ENCOUNTER — OUTPATIENT (OUTPATIENT)
Dept: OUTPATIENT SERVICES | Facility: HOSPITAL | Age: 43
LOS: 1 days | End: 2023-08-11
Payer: COMMERCIAL

## 2023-08-11 ENCOUNTER — TRANSCRIPTION ENCOUNTER (OUTPATIENT)
Age: 43
End: 2023-08-11

## 2023-08-11 VITALS
TEMPERATURE: 98 F | WEIGHT: 138.01 LBS | HEART RATE: 69 BPM | RESPIRATION RATE: 16 BRPM | HEIGHT: 65 IN | OXYGEN SATURATION: 100 % | SYSTOLIC BLOOD PRESSURE: 126 MMHG | DIASTOLIC BLOOD PRESSURE: 74 MMHG

## 2023-08-11 VITALS
HEART RATE: 58 BPM | OXYGEN SATURATION: 100 % | TEMPERATURE: 98 F | DIASTOLIC BLOOD PRESSURE: 61 MMHG | SYSTOLIC BLOOD PRESSURE: 112 MMHG | RESPIRATION RATE: 14 BRPM

## 2023-08-11 DIAGNOSIS — N84.0 POLYP OF CORPUS UTERI: Chronic | ICD-10-CM

## 2023-08-11 DIAGNOSIS — Z01.818 ENCOUNTER FOR OTHER PREPROCEDURAL EXAMINATION: ICD-10-CM

## 2023-08-11 DIAGNOSIS — Z98.890 OTHER SPECIFIED POSTPROCEDURAL STATES: Chronic | ICD-10-CM

## 2023-08-11 DIAGNOSIS — N84.0 POLYP OF CORPUS UTERI: ICD-10-CM

## 2023-08-11 DIAGNOSIS — Z90.49 ACQUIRED ABSENCE OF OTHER SPECIFIED PARTS OF DIGESTIVE TRACT: Chronic | ICD-10-CM

## 2023-08-11 LAB — HCG UR QL: NEGATIVE — SIGNIFICANT CHANGE UP

## 2023-08-11 PROCEDURE — 58120 DILATION AND CURETTAGE: CPT

## 2023-08-11 PROCEDURE — 88305 TISSUE EXAM BY PATHOLOGIST: CPT | Mod: 26

## 2023-08-11 PROCEDURE — 81025 URINE PREGNANCY TEST: CPT

## 2023-08-11 PROCEDURE — 88305 TISSUE EXAM BY PATHOLOGIST: CPT

## 2023-08-11 PROCEDURE — 58558 HYSTEROSCOPY BIOPSY: CPT

## 2023-08-11 RX ORDER — IBUPROFEN 200 MG
600 TABLET ORAL EVERY 6 HOURS
Refills: 0 | Status: DISCONTINUED | OUTPATIENT
Start: 2023-08-11 | End: 2023-08-25

## 2023-08-11 RX ORDER — SODIUM CHLORIDE 9 MG/ML
1000 INJECTION, SOLUTION INTRAVENOUS
Refills: 0 | Status: DISCONTINUED | OUTPATIENT
Start: 2023-08-11 | End: 2023-08-25

## 2023-08-11 RX ORDER — OMEPRAZOLE 10 MG/1
1 CAPSULE, DELAYED RELEASE ORAL
Refills: 0 | DISCHARGE

## 2023-08-11 RX ORDER — ACETAMINOPHEN 500 MG
975 TABLET ORAL EVERY 6 HOURS
Refills: 0 | Status: DISCONTINUED | OUTPATIENT
Start: 2023-08-11 | End: 2023-08-25

## 2023-08-11 RX ORDER — IBUPROFEN 200 MG
1 TABLET ORAL
Qty: 12 | Refills: 0
Start: 2023-08-11 | End: 2023-08-13

## 2023-08-11 RX ORDER — ONDANSETRON 8 MG/1
4 TABLET, FILM COATED ORAL ONCE
Refills: 0 | Status: DISCONTINUED | OUTPATIENT
Start: 2023-08-11 | End: 2023-08-25

## 2023-08-11 RX ORDER — ACETAMINOPHEN 500 MG
3 TABLET ORAL
Qty: 0 | Refills: 0 | DISCHARGE
Start: 2023-08-11

## 2023-08-11 RX ORDER — OXYCODONE AND ACETAMINOPHEN 5; 325 MG/1; MG/1
1 TABLET ORAL EVERY 4 HOURS
Refills: 0 | Status: DISCONTINUED | OUTPATIENT
Start: 2023-08-11 | End: 2023-08-11

## 2023-08-11 RX ORDER — ALBUTEROL 90 UG/1
2 AEROSOL, METERED ORAL
Refills: 0 | DISCHARGE

## 2023-08-11 NOTE — BRIEF OPERATIVE NOTE - NSICDXBRIEFPREOP_GEN_ALL_CORE_FT
PRE-OP DIAGNOSIS:  Abnormal uterine bleeding due to endometrial polyp 11-Aug-2023 15:16:04  Sebastian Sylvester

## 2023-08-11 NOTE — ASU PATIENT PROFILE, ADULT - NSICDXPASTMEDICALHX_GEN_ALL_CORE_FT
PAST MEDICAL HISTORY:  Asthma     GERD (gastroesophageal reflux disease)     History of goiter     Lower Back Pain     Polyp of corpus uteri     Refusal of Blood Transfusions as Patient Is [facility]

## 2023-08-11 NOTE — ASU DISCHARGE PLAN (ADULT/PEDIATRIC) - NS MD DC FALL RISK RISK
For information on Fall & Injury Prevention, visit: [email or web address] OR  [email or web address] OR  https://www.cdc.gov/steadi/patient.html

## 2023-08-11 NOTE — ASU PATIENT PROFILE, ADULT - FALL HARM RISK - UNIVERSAL INTERVENTIONS
Bed in lowest position, wheels locked, appropriate side rails in place/Call bell, personal items and telephone in reach/Instruct patient to call for assistance before getting out of bed or chair/Non-slip footwear when patient is out of bed/[place] to call system/Physically safe environment - no spills, clutter or unnecessary equipment/Purposeful Proactive Rounding/Room/bathroom lighting operational, light cord in reach

## 2023-08-11 NOTE — ASU DISCHARGE PLAN (ADULT/PEDIATRIC) - CARE PROVIDER_API CALL
Sebastian Sylvester  Obstetrics and Gynecology  [address], [address]  [place] [address]  Phone: [phone]  Fax: [phone]  Follow Up Time:

## 2023-08-16 PROBLEM — N84.0 POLYP OF CORPUS UTERI: Chronic | Status: ACTIVE | Noted: 2023-08-04

## 2023-08-16 PROBLEM — Z86.39 PERSONAL HISTORY OF OTHER ENDOCRINE, NUTRITIONAL AND METABOLIC DISEASE: Chronic | Status: ACTIVE | Noted: 2023-08-04

## 2023-08-16 PROBLEM — K21.9 GASTRO-ESOPHAGEAL REFLUX DISEASE WITHOUT ESOPHAGITIS: Chronic | Status: ACTIVE | Noted: 2023-08-04

## 2023-08-16 LAB — SURGICAL PATHOLOGY STUDY: SIGNIFICANT CHANGE UP

## 2023-08-23 ENCOUNTER — APPOINTMENT (OUTPATIENT)
Dept: OBGYN | Facility: CLINIC | Age: 43
End: 2023-08-23
Payer: COMMERCIAL

## 2023-08-23 VITALS
HEIGHT: 65 IN | DIASTOLIC BLOOD PRESSURE: 80 MMHG | SYSTOLIC BLOOD PRESSURE: 121 MMHG | BODY MASS INDEX: 22.82 KG/M2 | RESPIRATION RATE: 18 BRPM | WEIGHT: 137 LBS | HEART RATE: 83 BPM | TEMPERATURE: 98.5 F | OXYGEN SATURATION: 100 %

## 2023-08-23 PROCEDURE — 99024 POSTOP FOLLOW-UP VISIT: CPT

## 2023-08-28 NOTE — HISTORY OF PRESENT ILLNESS
[Pain is well-controlled] : pain is well-controlled [Fever] : no fever [Chills] : no chills [Nausea] : no nausea [Vomiting] : no vomiting [Diarrhea] : no diarrhea [Vaginal Bleeding] : no vaginal bleeding [Pelvic Pressure] : no pelvic pressure [Dysuria] : no dysuria [Vaginal Discharge] : no vaginal discharge [Constipation] : no constipation [None] : no vaginal bleeding [Normal] : normal [Pathology reviewed] : pathology reviewed

## 2023-08-28 NOTE — PLAN
[None] : None [de-identified] : pt to monitor for postcoital bleeding. if normal return for annual

## 2023-09-06 ENCOUNTER — APPOINTMENT (OUTPATIENT)
Dept: NEUROLOGY | Facility: CLINIC | Age: 43
End: 2023-09-06

## 2023-09-18 ENCOUNTER — APPOINTMENT (OUTPATIENT)
Dept: ENDOCRINOLOGY | Facility: CLINIC | Age: 43
End: 2023-09-18
Payer: COMMERCIAL

## 2023-09-18 VITALS
OXYGEN SATURATION: 99 % | BODY MASS INDEX: 22.82 KG/M2 | SYSTOLIC BLOOD PRESSURE: 122 MMHG | HEART RATE: 71 BPM | HEIGHT: 65 IN | WEIGHT: 137 LBS | DIASTOLIC BLOOD PRESSURE: 70 MMHG

## 2023-09-18 DIAGNOSIS — E04.1 NONTOXIC SINGLE THYROID NODULE: ICD-10-CM

## 2023-09-18 PROCEDURE — 99214 OFFICE O/P EST MOD 30 MIN: CPT | Mod: 25

## 2023-09-27 ENCOUNTER — APPOINTMENT (OUTPATIENT)
Dept: OTOLARYNGOLOGY | Facility: CLINIC | Age: 43
End: 2023-09-27

## 2023-10-24 PROBLEM — M62.838 NECK MUSCLE SPASM: Status: ACTIVE | Noted: 2022-05-21

## 2023-11-20 ENCOUNTER — EMERGENCY (EMERGENCY)
Facility: HOSPITAL | Age: 43
LOS: 1 days | Discharge: HOME | End: 2023-11-20
Attending: STUDENT IN AN ORGANIZED HEALTH CARE EDUCATION/TRAINING PROGRAM
Payer: COMMERCIAL

## 2023-11-20 VITALS
SYSTOLIC BLOOD PRESSURE: 133 MMHG | RESPIRATION RATE: 18 BRPM | OXYGEN SATURATION: 96 % | TEMPERATURE: 98 F | HEART RATE: 76 BPM | DIASTOLIC BLOOD PRESSURE: 80 MMHG

## 2023-11-20 VITALS
SYSTOLIC BLOOD PRESSURE: 137 MMHG | HEIGHT: 65 IN | HEART RATE: 77 BPM | WEIGHT: 138.01 LBS | OXYGEN SATURATION: 98 % | TEMPERATURE: 98 F | DIASTOLIC BLOOD PRESSURE: 85 MMHG | RESPIRATION RATE: 16 BRPM

## 2023-11-20 DIAGNOSIS — Z98.890 OTHER SPECIFIED POSTPROCEDURAL STATES: Chronic | ICD-10-CM

## 2023-11-20 DIAGNOSIS — Z90.49 ACQUIRED ABSENCE OF OTHER SPECIFIED PARTS OF DIGESTIVE TRACT: Chronic | ICD-10-CM

## 2023-11-20 DIAGNOSIS — N84.0 POLYP OF CORPUS UTERI: Chronic | ICD-10-CM

## 2023-11-20 PROCEDURE — 99284 EMERGENCY DEPT VISIT MOD MDM: CPT

## 2023-11-20 RX ORDER — LIDOCAINE 4 G/100G
1 CREAM TOPICAL ONCE
Refills: 0 | Status: COMPLETED | OUTPATIENT
Start: 2023-11-20 | End: 2023-11-20

## 2023-11-20 RX ORDER — IBUPROFEN 200 MG
600 TABLET ORAL ONCE
Refills: 0 | Status: COMPLETED | OUTPATIENT
Start: 2023-11-20 | End: 2023-11-20

## 2023-11-20 RX ORDER — ACETAMINOPHEN 500 MG
975 TABLET ORAL ONCE
Refills: 0 | Status: COMPLETED | OUTPATIENT
Start: 2023-11-20 | End: 2023-11-20

## 2023-11-20 RX ADMIN — Medication 600 MILLIGRAM(S): at 15:34

## 2023-11-20 RX ADMIN — LIDOCAINE 1 PATCH: 4 CREAM TOPICAL at 15:34

## 2023-11-20 RX ADMIN — Medication 975 MILLIGRAM(S): at 15:34
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
## 2023-11-20 NOTE — ED PROVIDER NOTE - PATIENT PORTAL LINK FT
You can access the FollowMyHealth Patient Portal offered by [facility] by registering at the following website: [email or web address]. By joining [employer]’s FollowMyHealth portal, you will also be able to view your health information using other applications (apps) compatible with our system.
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
## 2023-11-20 NOTE — ED ADULT TRIAGE NOTE - NS ED TRIAGE AVPU SCALE
Alert-The patient is alert, awake and responds to voice. The patient is oriented to time, place, and person. The triage nurse is able to obtain subjective information. Topical Retinoid counseling:  Patient advised to apply a pea-sized amount only at bedtime and wait 30 minutes after washing their face before applying.  If too drying, patient may add a non-comedogenic moisturizer. The patient verbalized understanding of the proper use and possible adverse effects of retinoids.  All of the patient's questions and concerns were addressed.
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
## 2023-12-01 ENCOUNTER — NON-APPOINTMENT (OUTPATIENT)
Age: 43
End: 2023-12-01

## 2023-12-04 ENCOUNTER — NON-APPOINTMENT (OUTPATIENT)
Age: 43
End: 2023-12-04

## 2023-12-04 ENCOUNTER — APPOINTMENT (OUTPATIENT)
Dept: PAIN MANAGEMENT | Facility: CLINIC | Age: 43
End: 2023-12-04

## 2024-02-16 NOTE — ED PROVIDER NOTE - RESPIRATORY NEGATIVE STATEMENT, MLM
Post ED follow up, TETE called patient to follow up on any post head injury treatment for the work comp injury.   NCM left message asking to please call me directly if she needs assistance will follow up appt in seeking Occupational Clinic near her. Provided my direct line [phone], ext [phone].   no chest pain, no cough, and no shortness of breath.

## 2024-04-30 ENCOUNTER — EMERGENCY (EMERGENCY)
Facility: HOSPITAL | Age: 44
LOS: 1 days | Discharge: HOME | End: 2024-04-30
Attending: EMERGENCY MEDICINE
Payer: COMMERCIAL

## 2024-04-30 VITALS
RESPIRATION RATE: 18 BRPM | TEMPERATURE: 99 F | DIASTOLIC BLOOD PRESSURE: 91 MMHG | WEIGHT: 139.99 LBS | SYSTOLIC BLOOD PRESSURE: 167 MMHG | OXYGEN SATURATION: 98 % | HEIGHT: 65 IN | HEART RATE: 65 BPM

## 2024-04-30 DIAGNOSIS — N84.0 POLYP OF CORPUS UTERI: Chronic | ICD-10-CM

## 2024-04-30 DIAGNOSIS — Z98.890 OTHER SPECIFIED POSTPROCEDURAL STATES: Chronic | ICD-10-CM

## 2024-04-30 DIAGNOSIS — Z90.49 ACQUIRED ABSENCE OF OTHER SPECIFIED PARTS OF DIGESTIVE TRACT: Chronic | ICD-10-CM

## 2024-04-30 PROCEDURE — 99283 EMERGENCY DEPT VISIT LOW MDM: CPT

## 2024-04-30 PROCEDURE — 99282 EMERGENCY DEPT VISIT SF MDM: CPT

## 2024-04-30 NOTE — ED PROVIDER NOTE - PHYSICAL EXAMINATION
bruising to the dorsum of the right hand  5/5 strength upper extremities  vasc 2+ pulses, <2 second cap refill bruising to the dorsum of the right hand  5/5 strength upper extremities  vasc 2+ pulses, <2 second cap refill      Attending note.  Patient is alert no acute distress.  Examination of the right hand reveals ecchymotic area on the dorsum consistent with broken vein.  There is no hematoma.  Tendons are intact when stressed.  Sensation is intact and normal.

## 2024-04-30 NOTE — ED PROVIDER NOTE - NSFOLLOWUPINSTRUCTIONS_ED_ALL_ED_FT
REST ELEVATE ICE AFFECTED EXTREMITY 20 MINUTES ON AND 20 MINUTES OFF  RETURN TO ED FOR SIGNIFICANT SWELLING, NUMBNESS, WEAKNESS, OR BRUISING  TAKE TYLENOL 1000MG EVERY 6 HOURS AS NEEDED AND ALTERNATE WITH NSAIDS SUCH AS MOTRIN ALEVE ADVIL 400MG EVERY 8 HOURS

## 2024-04-30 NOTE — ED ADULT TRIAGE NOTE - CHIEF COMPLAINT QUOTE
complaining of R hand pain after changing the bed x 30 minutes ago.   Noted with dark discoloration to hand.   Denies AC use.

## 2024-04-30 NOTE — ED PROVIDER NOTE - PATIENT PORTAL LINK FT
You can access the FollowMyHealth Patient Portal offered by [facility] by registering at the following website: [email or web address]. By joining [employer]’s FollowMyHealth portal, you will also be able to view your health information using other applications (apps) compatible with our system.

## 2024-04-30 NOTE — ED PROVIDER NOTE - CLINICAL SUMMARY MEDICAL DECISION MAKING FREE TEXT BOX
Attending note.  Ruptured vein on dorsum of right hand.  Patient declines Ace bandage.  No further treatment required.

## 2024-04-30 NOTE — ED ADULT NURSE NOTE - NSICDXPASTMEDICALHX_GEN_ALL_CORE_FT
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

## 2024-04-30 NOTE — ED PROVIDER NOTE - OBJECTIVE STATEMENT
44 y/o female with no PMHx right hand dominant now presenting to the ED with atraumatic bruising to the dorsum of the right hand. Patient was placing bed sheets on her bed and put her hand under the mattress to fasten bedsheet and then noticed painful bruising to her right hand. Patient stated this has never happened before. Has not taken meds for pain. Event occurred about two hours prior to ED arrival. Denied numbness, weakness, reduced strength, cool extremity, AC or ASA use, easy bruising 44 y/o female with no PMHx right hand dominant now presenting to the ED with atraumatic bruising to the dorsum of the right hand. Patient was placing bed sheets on her bed and put her hand under the mattress to fasten bedsheet and then noticed painful bruising to her right hand. Patient stated this has never happened before. Has not taken meds for pain. Event occurred about two hours prior to ED arrival. Denied numbness, weakness, reduced strength, cool extremity, AC or ASA use, easy bruising      Attending note.  Patient was seen on room 33 to the left.  Agree with above.  Patient felt pain after making a bed this evening and now has bruising on the dorsum of the hand.  She has no other loss of function, numbness or paresthesia.  She does not take aspirin or anticoagulants.

## 2024-04-30 NOTE — ED ADULT NURSE NOTE - OBJECTIVE STATEMENT
2 y/o female with no PMHx right hand dominant now presenting to the ED with atraumatic bruising to the dorsum of the right hand. Patient was placing bed sheets on her bed and put her hand under the mattress to fasten bedsheet and then noticed painful bruising to her right hand. Patient stated this has never happened before. Has not taken meds for pain. Event occurred about two hours prior to ED arrival. Denied numbness, weakness, reduced strength, cool extremity, AC or ASA use, easy bruising

## 2024-06-11 NOTE — PHYSICAL EXAM
[FreeTextEntry1] : PE:\par Constitutional: In NAD, calm and cooperative\par HEENT: NCAT\par Cardio: Extremities appear pink and well perfused\par Respiratory: Normal respiratory effort on room air, no accessory muscle use\par Skin: no rashes seen on exposed skin, no visible abrasions\par Psych: Normal affect, intact judgment and insight\par Neuro: Awake, alert and oriented x 3, see below for focused neurological exam\par \par MSK:\par                 Inspection: no gross swelling identified, but Right sternoclavicular joint is more prominent than on the Left\par                 Palpation: Tenderness of the bilateral cervical paraspinals, trapezius, and periscapular region, R>L. Mild TTP over Right sternoclavicular joint and mid-clavicle. No TTP over b/l AC joints\par                 ROM: Diminished cervical extension/flexion with posterior neck tightness. ~45 degrees head rotation to the Right and at least 60 degrees rotation to the Left. \par                 Strength: 5/5 strength in bilateral upper extremities\par                 Sensation: Intact to light touch in bilateral upper extremities\par                 Special tests: Spurling’s test negative bilaterally; Phalen's negative bilaterally. Negative b/l empty can, resisted external shoulder rotation, and belly press test. Reports Right-sided clavicular pain with scarf test, Neer's, and Hawkin's
1000

## 2024-07-15 NOTE — HISTORY OF PRESENT ILLNESS
[FreeTextEntry1] :    PMH constipation, asthma, anemia, anal fissure, anal prolapes, goiter, herniated cervical disc, thyroid nodule PSH appendectomy, uterine polypectomy, breast biopsy NKDA  2023 saline sonogram ut polyps, EE 2 mm, R Ov wnl

## 2024-07-31 NOTE — ASU PATIENT PROFILE, ADULT - PATIENT REPRESENTATIVE: ( YOU CAN CHOOSE ANY PERSON THAT CAN ASSIST YOU WITH YOUR HEALTH CARE PREFERENCES, DOES NOT HAVE TO BE A SPOUSE, IMMEDIATE FAMILY OR SIGNIFICANT OTHER/PARTNER)
CC:  Lavonne SEAN Cipriano is here today for Office Visit and Colitis (Flare up)     Medications: medications verified and updated  Refills needed today?  NO  denies Latex allergy or sensitivity  Patient would like communication of their results via:    Home Phone: [phone] (home)  Okay to leave a message containing results? Yes  Tobacco history: verified  Patient's current [facility] status: Active.    Pharmacy Verified?  Yes         Health Maintenance       DM/CKD Microalbumin (Yearly)  Order placed this encounter    Traditional Medicare- Medicare Wellness Visit (Yearly)  Overdue since 2/1/2024    COVID-19 Vaccine (8 - 2023-24 season)  Overdue since 2/26/2024           Following review of the above:  Pended orders    Note: Refer to final orders and clinician documentation.           Declines

## 2024-09-17 ENCOUNTER — APPOINTMENT (OUTPATIENT)
Dept: UROGYNECOLOGY | Facility: CLINIC | Age: 44
End: 2024-09-17

## 2025-03-03 ENCOUNTER — APPOINTMENT (OUTPATIENT)
Dept: ENDOCRINOLOGY | Facility: CLINIC | Age: 45
End: 2025-03-03
Payer: COMMERCIAL

## 2025-03-03 VITALS
HEART RATE: 67 BPM | DIASTOLIC BLOOD PRESSURE: 68 MMHG | WEIGHT: 138 LBS | OXYGEN SATURATION: 99 % | SYSTOLIC BLOOD PRESSURE: 118 MMHG | BODY MASS INDEX: 22.96 KG/M2

## 2025-03-03 DIAGNOSIS — E04.1 NONTOXIC SINGLE THYROID NODULE: ICD-10-CM

## 2025-03-03 PROCEDURE — 99214 OFFICE O/P EST MOD 30 MIN: CPT

## 2025-03-05 LAB
T4 FREE SERPL-MCNC: 1.3 NG/DL
TSH SERPL-ACNC: 0.69 UIU/ML

## 2025-03-06 ENCOUNTER — TRANSCRIPTION ENCOUNTER (OUTPATIENT)
Age: 45
End: 2025-03-06

## 2025-03-07 ENCOUNTER — APPOINTMENT (OUTPATIENT)
Dept: ULTRASOUND IMAGING | Facility: CLINIC | Age: 45
End: 2025-03-07
Payer: COMMERCIAL

## 2025-03-07 PROCEDURE — 76536 US EXAM OF HEAD AND NECK: CPT

## 2025-07-26 ENCOUNTER — EMERGENCY (EMERGENCY)
Facility: HOSPITAL | Age: 45
LOS: 1 days | End: 2025-07-26
Attending: STUDENT IN AN ORGANIZED HEALTH CARE EDUCATION/TRAINING PROGRAM
Payer: COMMERCIAL

## 2025-07-26 VITALS
TEMPERATURE: 98 F | RESPIRATION RATE: 16 BRPM | SYSTOLIC BLOOD PRESSURE: 150 MMHG | HEART RATE: 69 BPM | HEIGHT: 65 IN | DIASTOLIC BLOOD PRESSURE: 82 MMHG | OXYGEN SATURATION: 99 % | WEIGHT: 138.01 LBS

## 2025-07-26 VITALS — HEART RATE: 55 BPM

## 2025-07-26 DIAGNOSIS — N84.0 POLYP OF CORPUS UTERI: Chronic | ICD-10-CM

## 2025-07-26 DIAGNOSIS — Z98.890 OTHER SPECIFIED POSTPROCEDURAL STATES: Chronic | ICD-10-CM

## 2025-07-26 DIAGNOSIS — Z90.49 ACQUIRED ABSENCE OF OTHER SPECIFIED PARTS OF DIGESTIVE TRACT: Chronic | ICD-10-CM

## 2025-07-26 LAB
ALBUMIN SERPL ELPH-MCNC: 3.9 G/DL — SIGNIFICANT CHANGE UP (ref 3.5–5)
ALP SERPL-CCNC: 80 U/L — SIGNIFICANT CHANGE UP (ref 40–120)
ALT FLD-CCNC: 22 U/L DA — SIGNIFICANT CHANGE UP (ref 10–60)
ANION GAP SERPL CALC-SCNC: 3 MMOL/L — LOW (ref 5–17)
AST SERPL-CCNC: 14 U/L — SIGNIFICANT CHANGE UP (ref 10–40)
BASOPHILS # BLD AUTO: 0.03 K/UL — SIGNIFICANT CHANGE UP (ref 0–0.2)
BASOPHILS NFR BLD AUTO: 0.7 % — SIGNIFICANT CHANGE UP (ref 0–2)
BILIRUB SERPL-MCNC: 0.7 MG/DL — SIGNIFICANT CHANGE UP (ref 0.2–1.2)
BUN SERPL-MCNC: 13 MG/DL — SIGNIFICANT CHANGE UP (ref 7–18)
CALCIUM SERPL-MCNC: 8.6 MG/DL — SIGNIFICANT CHANGE UP (ref 8.4–10.5)
CHLORIDE SERPL-SCNC: 110 MMOL/L — HIGH (ref 96–108)
CO2 SERPL-SCNC: 26 MMOL/L — SIGNIFICANT CHANGE UP (ref 22–31)
CREAT SERPL-MCNC: 0.63 MG/DL — SIGNIFICANT CHANGE UP (ref 0.5–1.3)
EGFR: 113 ML/MIN/1.73M2 — SIGNIFICANT CHANGE UP
EGFR: 113 ML/MIN/1.73M2 — SIGNIFICANT CHANGE UP
EOSINOPHIL # BLD AUTO: 0.06 K/UL — SIGNIFICANT CHANGE UP (ref 0–0.5)
EOSINOPHIL NFR BLD AUTO: 1.5 % — SIGNIFICANT CHANGE UP (ref 0–6)
GLUCOSE SERPL-MCNC: 95 MG/DL — SIGNIFICANT CHANGE UP (ref 70–99)
HCG SERPL-ACNC: <1 MIU/ML — SIGNIFICANT CHANGE UP
HCT VFR BLD CALC: 41.4 % — SIGNIFICANT CHANGE UP (ref 34.5–45)
HGB BLD-MCNC: 13.8 G/DL — SIGNIFICANT CHANGE UP (ref 11.5–15.5)
IMM GRANULOCYTES NFR BLD AUTO: 0.2 % — SIGNIFICANT CHANGE UP (ref 0–0.9)
LIDOCAIN IGE QN: 19 U/L — SIGNIFICANT CHANGE UP (ref 13–75)
LYMPHOCYTES # BLD AUTO: 0.98 K/UL — LOW (ref 1–3.3)
LYMPHOCYTES # BLD AUTO: 24.4 % — SIGNIFICANT CHANGE UP (ref 13–44)
MAGNESIUM SERPL-MCNC: 2 MG/DL — SIGNIFICANT CHANGE UP (ref 1.6–2.6)
MCHC RBC-ENTMCNC: 32.3 PG — SIGNIFICANT CHANGE UP (ref 27–34)
MCHC RBC-ENTMCNC: 33.3 G/DL — SIGNIFICANT CHANGE UP (ref 32–36)
MCV RBC AUTO: 97 FL — SIGNIFICANT CHANGE UP (ref 80–100)
MONOCYTES # BLD AUTO: 0.32 K/UL — SIGNIFICANT CHANGE UP (ref 0–0.9)
MONOCYTES NFR BLD AUTO: 8 % — SIGNIFICANT CHANGE UP (ref 2–14)
NEUTROPHILS # BLD AUTO: 2.61 K/UL — SIGNIFICANT CHANGE UP (ref 1.8–7.4)
NEUTROPHILS NFR BLD AUTO: 65.2 % — SIGNIFICANT CHANGE UP (ref 43–77)
NRBC BLD AUTO-RTO: 0 /100 WBCS — SIGNIFICANT CHANGE UP (ref 0–0)
NT-PROBNP SERPL-SCNC: 28 PG/ML — SIGNIFICANT CHANGE UP (ref 0–125)
PLATELET # BLD AUTO: 180 K/UL — SIGNIFICANT CHANGE UP (ref 150–400)
POTASSIUM SERPL-MCNC: 3.5 MMOL/L — SIGNIFICANT CHANGE UP (ref 3.5–5.3)
POTASSIUM SERPL-SCNC: 3.5 MMOL/L — SIGNIFICANT CHANGE UP (ref 3.5–5.3)
PROT SERPL-MCNC: 6.7 G/DL — SIGNIFICANT CHANGE UP (ref 6–8.3)
RBC # BLD: 4.27 M/UL — SIGNIFICANT CHANGE UP (ref 3.8–5.2)
RBC # FLD: 12.3 % — SIGNIFICANT CHANGE UP (ref 10.3–14.5)
SODIUM SERPL-SCNC: 139 MMOL/L — SIGNIFICANT CHANGE UP (ref 135–145)
TROPONIN I, HIGH SENSITIVITY RESULT: 8.9 NG/L — SIGNIFICANT CHANGE UP
WBC # BLD: 4.01 K/UL — SIGNIFICANT CHANGE UP (ref 3.8–10.5)
WBC # FLD AUTO: 4.01 K/UL — SIGNIFICANT CHANGE UP (ref 3.8–10.5)

## 2025-07-26 PROCEDURE — 83690 ASSAY OF LIPASE: CPT

## 2025-07-26 PROCEDURE — 93005 ELECTROCARDIOGRAM TRACING: CPT

## 2025-07-26 PROCEDURE — 99285 EMERGENCY DEPT VISIT HI MDM: CPT | Mod: 25

## 2025-07-26 PROCEDURE — 36415 COLL VENOUS BLD VENIPUNCTURE: CPT

## 2025-07-26 PROCEDURE — 80053 COMPREHEN METABOLIC PANEL: CPT

## 2025-07-26 PROCEDURE — 71045 X-RAY EXAM CHEST 1 VIEW: CPT

## 2025-07-26 PROCEDURE — 84484 ASSAY OF TROPONIN QUANT: CPT

## 2025-07-26 PROCEDURE — 83735 ASSAY OF MAGNESIUM: CPT

## 2025-07-26 PROCEDURE — 71045 X-RAY EXAM CHEST 1 VIEW: CPT | Mod: 26

## 2025-07-26 PROCEDURE — 83880 ASSAY OF NATRIURETIC PEPTIDE: CPT

## 2025-07-26 PROCEDURE — 96375 TX/PRO/DX INJ NEW DRUG ADDON: CPT

## 2025-07-26 PROCEDURE — 85025 COMPLETE CBC W/AUTO DIFF WBC: CPT

## 2025-07-26 PROCEDURE — 84702 CHORIONIC GONADOTROPIN TEST: CPT

## 2025-07-26 PROCEDURE — 96374 THER/PROPH/DIAG INJ IV PUSH: CPT

## 2025-07-26 PROCEDURE — 99285 EMERGENCY DEPT VISIT HI MDM: CPT

## 2025-07-26 RX ORDER — ACETAMINOPHEN 500 MG/5ML
1000 LIQUID (ML) ORAL ONCE
Refills: 0 | Status: COMPLETED | OUTPATIENT
Start: 2025-07-26 | End: 2025-07-26

## 2025-07-26 RX ORDER — LIDOCAINE HYDROCHLORIDE 20 MG/ML
1 JELLY TOPICAL ONCE
Refills: 0 | Status: COMPLETED | OUTPATIENT
Start: 2025-07-26 | End: 2025-07-26

## 2025-07-26 RX ORDER — CYCLOBENZAPRINE HYDROCHLORIDE 15 MG/1
5 CAPSULE, EXTENDED RELEASE ORAL ONCE
Refills: 0 | Status: COMPLETED | OUTPATIENT
Start: 2025-07-26 | End: 2025-07-26

## 2025-07-26 RX ORDER — KETOROLAC TROMETHAMINE 30 MG/ML
15 INJECTION, SOLUTION INTRAMUSCULAR; INTRAVENOUS ONCE
Refills: 0 | Status: DISCONTINUED | OUTPATIENT
Start: 2025-07-26 | End: 2025-07-26

## 2025-07-26 RX ORDER — CYCLOBENZAPRINE HYDROCHLORIDE 15 MG/1
1 CAPSULE, EXTENDED RELEASE ORAL
Qty: 10 | Refills: 0
Start: 2025-07-26 | End: 2025-07-30

## 2025-07-26 RX ADMIN — Medication 20 MILLIGRAM(S): at 08:34

## 2025-07-26 RX ADMIN — KETOROLAC TROMETHAMINE 15 MILLIGRAM(S): 30 INJECTION, SOLUTION INTRAMUSCULAR; INTRAVENOUS at 09:32

## 2025-07-26 RX ADMIN — Medication 1000 MILLIGRAM(S): at 08:49

## 2025-07-26 RX ADMIN — Medication 400 MILLIGRAM(S): at 08:34

## 2025-07-26 RX ADMIN — CYCLOBENZAPRINE HYDROCHLORIDE 5 MILLIGRAM(S): 15 CAPSULE, EXTENDED RELEASE ORAL at 09:32

## 2025-07-26 RX ADMIN — LIDOCAINE HYDROCHLORIDE 1 PATCH: 20 JELLY TOPICAL at 09:34

## 2025-07-26 NOTE — ED PROVIDER NOTE - PATIENT PORTAL LINK FT
You can access the FollowMyHealth Patient Portal offered by [facility] by registering at the following website: [email or web address]. By joining [employer]’s FollowMyHealth portal, you will also be able to view your health information using other applications (apps) compatible with our system.

## 2025-07-26 NOTE — ED PROVIDER NOTE - PROGRESS NOTE DETAILS
Labs imaging within normal limits.  Requesting to go home.  Symptoms likely musculoskeletal.  Patient has close outpatient follow-up.  Will DC.  Vital signs stable. Natalie Hale MD.

## 2025-07-26 NOTE — ED PROVIDER NOTE - OBJECTIVE STATEMENT
Patient is a 43 yo female with no significant PMHx presenting with L sided epigastric and chest pain since this morning. patient states she has had dull intermittent L sided chest and epigastric pain since yesterday. Endorsing some L sided jaw and neck pain as well. Denies any PMHx, allergies, smoking, alcohol use, IVDU, FH cardiac disease. Denies any recent travel or OCP use.

## 2025-07-26 NOTE — ED PROVIDER NOTE - CLINICAL SUMMARY MEDICAL DECISION MAKING FREE TEXT BOX
Patient is a 45 yo female with no significant PMHx presenting with L sided epigastric and chest pain since this morning. patient states she has had dull intermittent L sided chest and epigastric pain since yesterday. Vital signs stable, lungs clear, belly soft nontender  – Will check labs, rule out electrolyte abnormality and pancreatitis, EKG troponin to assess for ACS versus other, chest x-ray to rule out heart failure versus pneumonia pneumothorax, reassess.

## 2025-07-26 NOTE — ED ADULT NURSE NOTE - SUICIDE SCREENING QUESTION 1
"  Call Dr. Roberto Carlos Umaña, Gastroenterology at [phone] if you have any problems or concerns.    We know you have a Choice in healthcare and appreciate you using [facility].  Our purpose is to provide you \"Excellent Care\".  We hope that you will always choose us in the future and continue to recommend us to your family and friends.              " No

## 2025-07-26 NOTE — ED ADULT NURSE NOTE - HIV OFFER
Subjective Data:  Reports increased UO with Metolazone yesterday  Oxygen being weaned off, BLE edema improved    Last Recorded Vitals:  Vitals:    24 1943 24 2319 24 0406 24 0743   BP: 105/70 93/58 121/75 114/67   BP Location:    Left arm   Patient Position:    Lying   Pulse: 77 85 77    Resp: 20 15     Temp: 36.6 °C (97.9 °F) 36.9 °C (98.4 °F) 36.9 °C (98.4 °F) 36.8 °C (98.3 °F)   TempSrc: Oral Oral Oral Oral   SpO2: 93% 93% 100% 97%   Weight:       Height:         Medical Gas Therapy: Supplemental oxygen  O2 Delivery Method: Nasal cannula  Weight  Av.1 kg (174 lb 6.4 oz)  Min: 78.8 kg (173 lb 12.8 oz)  Max: 79.4 kg (175 lb)    Last I/Os:  I/O last 3 completed shifts:  In: 360 (4.6 mL/kg) [P.O.:360]  Out: 4300 (54.5 mL/kg) [Urine:4300 (1.5 mL/kg/hr)]  Weight: 78.8 kg   I/O this shift:  In: -   Out: 900 [Urine:900]  Net IO Since Admission: -4,840 mL [24 1053]     Cardiology Testing, Last 3 Years:  EKG:  Electrocardiogram, 12-lead PRN ACS symptoms 2024 Atrial fibrillation CVR     Echo:  Transthoracic echo (TTE) limited 2024   1. Left ventricular systolic function is normal with a 60-65% estimated ejection fraction.   2. There is no evidence of left ventricular hypertrophy.   3. The left atrium is severely dilated.   4. Moderate tricuspid regurgitation visualized.   5. Mild aortic valve stenosis.   6. There is moderate aortic valve cusp calcification.   7. Mild aortic valve regurgitation.     Echocardiogram 2023  1. The patient is in atrial fibrillation which may influence the estimate of left ventricular function and transvalvular flows.  2. Left ventricular systolic function is normal with a 60% estimated ejection fraction.  3. The right atrium is mild to moderately dilated.  4. There is moderate aortic valve cusp calcification.  5. Moderate aortic valve stenosis.  6. Moderate tricuspid regurgitation visualized.  7. Moderately elevated pulmonary artery  pressure.  8. The inferior vena cava appears moderately dilated.     Echocardiogram 4/6/2021   1. The left ventricular systolic function is mildly to moderately decreased with a 40-45% estimated ejection fraction.   2. Spectral Doppler shows an impaired relaxation pattern of left ventricular diastolic filling.   3. The left atrium is severely dilated.   4. The right atrium is moderately dilated.   5. Moderately elevated right ventricular systolic pressure.   6. There is moderate tricuspid regurgitation.   7. Mild aortic valve stenosis.   8. There is moderate aortic valve cusp calcification.   9. There is global hypokinesis of the left ventricle with minor regional variations.  10. Compared with study from 2/24/2020, left ventricular function has declined.     Echocardiogram 2/24/2020   1. The left ventricular systolic function is normal with a 60% estimated ejection fraction.   2. Spectral Doppler shows an abnormal pattern of left ventricular diastolic filling.   3. The left atrium is moderately dilated.   4. Moderately elevated right ventricular systolic pressure.   5. There is mild to moderate tricuspid regurgitation.   6. There is moderate aortic valve cusp calcification.   7. There is normal flow, low gradient aortic stenosis present. See details above.     Cath: No results found for this or any previous visit from the past 1095 days.     Stress Test:  Nuclear stress test 2/20/2020  1. Normal myocardial perfusion study without evidence of ischemia or prior infarction.  2. The left ventricle is normal in size.  3. Normal LV wall motion with an LV EF estimated at 62% at rest and greater than 65% post stress.     Cardiac Imaging: No results found for this or any previous visit from the past 1095 days.    Diagnostic Results   Results from last 7 days   Lab Units 07/31/24  0634 07/30/24  0543 07/29/24  0624 07/28/24  2100 07/26/24  1504   SODIUM mmol/L 139 139 140 136 137   POTASSIUM mmol/L 4.2 4.3 4.5 4.8 4.7    CHLORIDE mmol/L 96* 101 103 101 100   CO2 mmol/L 30 29 23 27 26   ANION GAP mmol/L 17 13 19 13 16   BUN mg/dL 58* 71* 68* 69* 68*   CREATININE mg/dL 1.36* 1.78* 1.80* 2.00* 2.32*   EGFR mL/min/1.73m*2 38* 27* 27* 24* 20*   MAGNESIUM mg/dL 1.90 1.90 2.00 2.00  --    ALBUMIN g/dL  --   --   --  3.7 4.0   ALT U/L  --   --   --  10  --    AST U/L  --   --   --  18  --    BILIRUBIN TOTAL mg/dL  --   --   --  0.7  --      Results from last 7 days   Lab Units 07/31/24  0634 07/30/24  0543 07/29/24  0624 07/28/24  2100 07/26/24  1504   WBC AUTO x10*3/uL 5.1 5.5 5.0 5.2 5.5   HEMOGLOBIN g/dL 7.4* 7.1* 7.4* 8.0* 7.8*   HEMATOCRIT % 25.3* 22.6* 24.9* 26.7* 26.5*   PLATELETS AUTO x10*3/uL 160 151 157 172 166   MCV fL 107* 101* 106* 106* 106*     Results from last 7 days   Lab Units 07/28/24  2100   INR  3.0*     ABO TYPE   Date Value Ref Range Status   07/28/2024 O  Final     Results from last 7 days   Lab Units 07/29/24  1806   FERRITIN ng/mL 30   IRON SATURATION % 8*     Results from last 7 days   Lab Units 07/29/24  1806 07/28/24  2225 07/28/24  2100   LD U/L 152  --   --    TROPHS ng/L  --  24* 25*   BNP pg/mL  --   --  973*      IMAGING:  XR chest 1 view 07/28/2024  1. Cardiomegaly and mild central vascular congestion.     XR abdomen 2 views w chest 1 view 07/22/2024  1. Nonobstructive bowel gas pattern.  2. No evidence of acute cardiopulmonary process.       Inpatient Medications:  Scheduled medications   Medication Dose Route Frequency    furosemide  40 mg intravenous 2 times per day    [Held by provider] hydrALAZINE  50 mg oral BID    insulin lispro  0-5 Units subcutaneous TID    [Held by provider] lisinopril  10 mg oral Daily    [Held by provider] metoprolol succinate XL  100 mg oral Daily    oxygen   inhalation Continuous - Inhalation    polyethylene glycol  17 g oral Daily    potassium chloride CR  10 mEq oral Daily    rivaroxaban  15 mg oral Daily    rosuvastatin  5 mg oral Daily   PRN medications: acetaminophen,  dextrose, dextrose, glucagon, glucagon      Physical Exam:  GENERAL: Well-appearing, elderly female, alert and awake, sitting up in bed, NAD   Body mass index is 29.83 kg/m².   CV: Irregularly irregular, +JVD, 1+ BLE edema  PULMONARY: Lungs clear with normal respiratory effort, on 2L NC  NEURO: Alert and conversant, grossly non-focal    Telemetry: AF with CVR, HR 70s      Assessment:  1. Acute on chronic diastolic HF  Responded well to Metolazone yesterday with improved volume status and renal fxn, remains on 2L NC  2. Atrial fibrillation, rate controlled  Remains CVR despite her beta-blockade remaining on hold (given soft BP's), H&H low but stable, Xarelto resumed  3. Aortic stenosis  Mild per last TTE, outpatient monitoring  4. History of cardiomyopathy  EF preserved by recent check, no indicated to repeat TTE  5. HTN  Outpatient therapies have been on hold d/t soft BPS  6. HLD  On statin therapy     Recommendations:   -c/w IV diuresis, likely transition to PO Torsemide tomorrow  -Add SGLT2 inhibitor prior to discharge  -c/w Xarelto  -Consider IV Iron prior to d/c  -Resume outpatient CV medications as indicated  -Continuous telemetry monitoring  -Monitor electrolytes, replete for K <4 and Mg <2  -Daily wts, strict I&Os, low sodium diet   -Patient to FU in HF clinic 1-2 weeks post-discharge, then 4-6 weeks with cardiologist      KALE Sidhu-CNP   Advanced Practice Provider  Cardiology  [facility]  07/31/24 11:11 AM     Opt out

## 2025-07-26 NOTE — ED PROVIDER NOTE - NSFOLLOWUPINSTRUCTIONS_ED_ALL_ED_FT
Chest Pain    WHAT YOU NEED TO KNOW:    Chest pain can be caused by a range of conditions, from not serious to life-threatening. Chest pain can be a symptom of a digestive problem, such as acid reflux or a stomach ulcer. An anxiety attack or a strong emotion, such as anger, can also cause chest pain. Infection, inflammation, or a fracture in the bones or cartilage in your chest can cause pain or discomfort. Sometimes chest pain or pressure is caused by poor blood flow to your heart (angina). Chest pain may also be caused by life-threatening conditions such as a heart attack or blood clot in your lungs.    DISCHARGE INSTRUCTIONS:    Call your local emergency number (911 in the [place]) or have someone call if:    You have any of the following signs of a heart attack:  Squeezing, pressure, or pain in your chest    You may also have any of the following:  Discomfort or pain in your back, neck, jaw, stomach, or arm    Shortness of breath    Nausea or vomiting    Lightheadedness or a sudden cold sweat    Return to the emergency department if:    You have chest discomfort that gets worse, even with medicine.    You cough or vomit blood.    Your bowel movements are black or bloody.    You cannot stop vomiting, or it hurts to swallow.  Call your doctor if:    You have questions or concerns about your condition or care.    Medicines:    Medicines may be given to treat the cause of your chest pain. Examples include pain medicine, anxiety medicine, or medicines to increase blood flow to your heart.    Do not take certain medicines without asking your healthcare provider first. These include NSAIDs, herbal or vitamin supplements, and hormones, such as estrogen or progestin.    Take your medicine as directed. Contact your healthcare provider if you think your medicine is not helping or if you have side effects. Tell your provider if you are allergic to any medicine. Keep a list of the medicines, vitamins, and herbs you take. Include the amounts, and when and why you take them. Bring the list or the pill bottles to follow-up visits. Carry your medicine list with you in case of an emergency.  Healthy living tips: If the cause of your chest pain is known, your healthcare provider will give you specific guidelines to follow. The following are general healthy guidelines:    Do not smoke. Nicotine and other chemicals in cigarettes and cigars can cause lung and heart damage. Ask your provider for information if you currently smoke and need help to quit. E-cigarettes or smokeless tobacco still contain nicotine. Talk to your provider before you use these products.    Choose a variety of healthy foods as often as possible. Include fresh, frozen, or canned fruits and vegetables. Also include low-fat dairy products, fish, chicken (without skin), and lean meats. Your provider or a dietitian can help you create meal plans. You may need to avoid certain foods or drinks if your pain is caused by a digestion problem.  Healthy Foods      Lower your sodium (salt) intake. Limit foods that are high in sodium, such as canned foods, salty snacks, and cold cuts. If you add salt when you cook food, do not add more at the table. Choose low-sodium canned foods as much as possible.        Drink plenty of water every day. Water helps your body to control your temperature and blood pressure. Ask your provider how much water you should drink every day.    Ask about activity. Your provider will tell you which activities to limit or avoid. Ask when you can drive, return to work, and have sex. Ask about the best exercise plan for you.    Maintain a healthy weight. Ask your provider what a healthy weight is for you. Ask your provider to help you create a safe weight loss plan if you are overweight.    Ask about vaccines you may need. Your provider can tell you if you also need vaccines not listed below, and when to get them:  Ask your healthcare provider about the flu and pneumonia vaccines. All adults should get the flu (influenza) vaccine as soon as recommended each year, usually in September or October. The pneumonia vaccine is recommended for all adults aged 50 or older to prevent pneumococcal disease, such as pneumonia. Adults aged 19 to 49 years who are at high risk for pneumococcal disease should also receive the vaccine. You may need 1 dose or 2. The number depends on the vaccine used and your risk factors.    COVID-19 vaccines are given to adults as a shot. At least 1 dose of an updated vaccine is recommended for all adults. COVID-19 vaccines are updated throughout the year. Adults 65 or older need a second dose of updated vaccine at least 4 months after the first dose. Your healthcare provider can help you schedule all needed doses as updated vaccines become available.  Prevent Heart Disease   Follow up with your doctor within 72 hours, or as directed: You may need to return for more tests to find the cause of your chest pain. You may be referred to a specialist, such as a cardiologist or gastroenterologist. Write down your questions so you remember to ask them during your visits.    © [employer] 1973, 2025

## 2025-09-04 ENCOUNTER — NON-APPOINTMENT (OUTPATIENT)
Age: 45
End: 2025-09-04

## 2025-09-15 ENCOUNTER — NON-APPOINTMENT (OUTPATIENT)
Age: 45
End: 2025-09-15

## 2025-09-16 ENCOUNTER — NON-APPOINTMENT (OUTPATIENT)
Age: 45
End: 2025-09-16

## 2025-09-16 ENCOUNTER — APPOINTMENT (OUTPATIENT)
Dept: UROLOGY | Facility: CLINIC | Age: 45
End: 2025-09-16
Payer: COMMERCIAL

## 2025-09-16 VITALS — SYSTOLIC BLOOD PRESSURE: 132 MMHG | HEART RATE: 69 BPM | DIASTOLIC BLOOD PRESSURE: 84 MMHG

## 2025-09-16 DIAGNOSIS — N20.0 CALCULUS OF KIDNEY: ICD-10-CM

## 2025-09-16 DIAGNOSIS — N13.30 UNSPECIFIED HYDRONEPHROSIS: ICD-10-CM

## 2025-09-16 PROCEDURE — G2211 COMPLEX E/M VISIT ADD ON: CPT | Mod: NC

## 2025-09-16 PROCEDURE — 99204 OFFICE O/P NEW MOD 45 MIN: CPT

## 2025-09-16 PROCEDURE — 99459 PELVIC EXAMINATION: CPT

## 2025-09-17 ENCOUNTER — APPOINTMENT (OUTPATIENT)
Dept: ULTRASOUND IMAGING | Facility: CLINIC | Age: 45
End: 2025-09-17
Payer: COMMERCIAL

## 2025-09-17 PROCEDURE — 76770 US EXAM ABDO BACK WALL COMP: CPT

## 2025-09-18 LAB
25(OH)D3 SERPL-MCNC: 19.5 NG/ML
ALBUMIN SERPL ELPH-MCNC: 4.6 G/DL
ALP BLD-CCNC: 75 U/L
ALT SERPL-CCNC: 13 U/L
ANION GAP SERPL CALC-SCNC: 17 MMOL/L
APPEARANCE: CLEAR
AST SERPL-CCNC: 19 U/L
BACTERIA: NEGATIVE /HPF
BILIRUB SERPL-MCNC: 0.4 MG/DL
BILIRUBIN URINE: NEGATIVE
BLOOD URINE: ABNORMAL
BUN SERPL-MCNC: 13 MG/DL
CALCIUM SERPL-MCNC: 9.4 MG/DL
CALCIUM SERPL-MCNC: 9.4 MG/DL
CAST: 0 /LPF
CHLORIDE SERPL-SCNC: 104 MMOL/L
CO2 SERPL-SCNC: 18 MMOL/L
COLOR: YELLOW
CREAT SERPL-MCNC: 0.63 MG/DL
EGFRCR SERPLBLD CKD-EPI 2021: 113 ML/MIN/1.73M2
EPITHELIAL CELLS: 3 /HPF
GLUCOSE QUALITATIVE U: NEGATIVE MG/DL
GLUCOSE SERPL-MCNC: 80 MG/DL
KETONES URINE: NEGATIVE MG/DL
LEUKOCYTE ESTERASE URINE: NEGATIVE
MICROSCOPIC-UA: NORMAL
NITRITE URINE: NEGATIVE
PARATHYROID HORMONE INTACT: 42 PG/ML
PH URINE: 6
POTASSIUM SERPL-SCNC: 4.6 MMOL/L
PROT SERPL-MCNC: 7.2 G/DL
PROTEIN URINE: NEGATIVE MG/DL
RED BLOOD CELLS URINE: 9 /HPF
SODIUM SERPL-SCNC: 140 MMOL/L
SPECIFIC GRAVITY URINE: 1.02
URATE SERPL-MCNC: 3.4 MG/DL
UROBILINOGEN URINE: 0.2 MG/DL
WHITE BLOOD CELLS URINE: 0 /HPF

## 2025-09-20 PROBLEM — N13.30 HYDRONEPHROSIS: Status: ACTIVE | Noted: 2025-09-20

## 2025-09-20 PROBLEM — N20.0 NEPHROLITHIASIS: Status: ACTIVE | Noted: 2025-09-20

## 2025-09-23 LAB — BACTERIA UR CULT: ABNORMAL

## (undated) DEVICE — TUBING STRYKER HYSTEROSCOPY INFLOW OUTFLOW

## (undated) DEVICE — VENODYNE/SCD SLEEVE CALF MEDIUM

## (undated) DEVICE — ELCTR CUTTING LOOP MONOPOLAR ANGLED 24F

## (undated) DEVICE — DRAPE LIGHT HANDLE COVER (BLUE)

## (undated) DEVICE — TUBING TUR 2 PRONG

## (undated) DEVICE — LAP PAD W RING 18 X 18"

## (undated) DEVICE — DRAPE LEGGINGS 6" CUFF 28X43"

## (undated) DEVICE — SOL IRR SORBITOL 3% 3000ML

## (undated) DEVICE — SOL IRR POUR H2O 500ML

## (undated) DEVICE — PACK PERI GYN

## (undated) DEVICE — TUBING SET TUR BLADDER IRRIGATION Y-TYPE 81"

## (undated) DEVICE — GOWN XL

## (undated) DEVICE — SOL IRR POUR NS 0.9% 1500ML

## (undated) DEVICE — SOL INJ NS 0.9% 500ML 1-PORT

## (undated) DEVICE — GLV 7.5 PROTEXIS (WHITE)

## (undated) DEVICE — BASIN SET SINGLE

## (undated) DEVICE — WARMING BLANKET UPPER ADULT